# Patient Record
Sex: FEMALE | Race: BLACK OR AFRICAN AMERICAN | Employment: OTHER | URBAN - METROPOLITAN AREA
[De-identification: names, ages, dates, MRNs, and addresses within clinical notes are randomized per-mention and may not be internally consistent; named-entity substitution may affect disease eponyms.]

---

## 2017-01-28 RX ORDER — DEXLANSOPRAZOLE 60 MG/1
CAPSULE, DELAYED RELEASE ORAL
Qty: 30 CAPSULE | Refills: 0 | Status: SHIPPED | OUTPATIENT
Start: 2017-01-28 | End: 2017-02-26 | Stop reason: SDUPTHER

## 2017-01-28 RX ORDER — BUPROPION HYDROCHLORIDE 300 MG/1
TABLET ORAL
Qty: 30 TABLET | Refills: 3 | Status: SHIPPED | OUTPATIENT
Start: 2017-01-28 | End: 2017-04-05

## 2017-03-02 RX ORDER — DEXLANSOPRAZOLE 60 MG/1
CAPSULE, DELAYED RELEASE ORAL
Qty: 30 CAPSULE | Refills: 0 | Status: SHIPPED | OUTPATIENT
Start: 2017-03-02 | End: 2017-04-05 | Stop reason: SDUPTHER

## 2017-03-21 ENCOUNTER — TELEPHONE (OUTPATIENT)
Dept: INTERNAL MEDICINE CLINIC | Age: 62
End: 2017-03-21

## 2017-03-21 DIAGNOSIS — Z12.31 VISIT FOR SCREENING MAMMOGRAM: ICD-10-CM

## 2017-03-21 DIAGNOSIS — Z12.11 SCREENING FOR COLORECTAL CANCER: Primary | ICD-10-CM

## 2017-03-21 DIAGNOSIS — Z12.12 SCREENING FOR COLORECTAL CANCER: Primary | ICD-10-CM

## 2017-03-28 RX ORDER — DEXLANSOPRAZOLE 60 MG/1
CAPSULE, DELAYED RELEASE ORAL
Qty: 30 CAPSULE | Refills: 0 | OUTPATIENT
Start: 2017-03-28

## 2017-04-05 ENCOUNTER — OFFICE VISIT (OUTPATIENT)
Dept: INTERNAL MEDICINE CLINIC | Age: 62
End: 2017-04-05
Payer: MEDICARE

## 2017-04-05 VITALS
HEIGHT: 63 IN | WEIGHT: 237.8 LBS | RESPIRATION RATE: 19 BRPM | DIASTOLIC BLOOD PRESSURE: 80 MMHG | BODY MASS INDEX: 42.13 KG/M2 | HEART RATE: 94 BPM | SYSTOLIC BLOOD PRESSURE: 138 MMHG | OXYGEN SATURATION: 97 %

## 2017-04-05 DIAGNOSIS — D50.0 IRON DEFICIENCY ANEMIA DUE TO CHRONIC BLOOD LOSS: ICD-10-CM

## 2017-04-05 DIAGNOSIS — E66.01 MORBID OBESITY WITH BMI OF 40.0-44.9, ADULT (HCC): ICD-10-CM

## 2017-04-05 DIAGNOSIS — M05.711 RHEUMATOID ARTHRITIS INVOLVING RIGHT SHOULDER WITH POSITIVE RHEUMATOID FACTOR (HCC): ICD-10-CM

## 2017-04-05 DIAGNOSIS — M81.0 OSTEOPOROSIS: ICD-10-CM

## 2017-04-05 DIAGNOSIS — K21.9 GASTROESOPHAGEAL REFLUX DISEASE WITHOUT ESOPHAGITIS: ICD-10-CM

## 2017-04-05 DIAGNOSIS — I10 ESSENTIAL HYPERTENSION: ICD-10-CM

## 2017-04-05 DIAGNOSIS — J44.9 CHRONIC OBSTRUCTIVE PULMONARY DISEASE, UNSPECIFIED COPD TYPE (HCC): Primary | ICD-10-CM

## 2017-04-05 PROCEDURE — 3014F SCREEN MAMMO DOC REV: CPT | Performed by: FAMILY MEDICINE

## 2017-04-05 PROCEDURE — G8926 SPIRO NO PERF OR DOC: HCPCS | Performed by: FAMILY MEDICINE

## 2017-04-05 PROCEDURE — 99214 OFFICE O/P EST MOD 30 MIN: CPT | Performed by: FAMILY MEDICINE

## 2017-04-05 PROCEDURE — 4005F PHARM THX FOR OP RXD: CPT | Performed by: FAMILY MEDICINE

## 2017-04-05 PROCEDURE — 3017F COLORECTAL CA SCREEN DOC REV: CPT | Performed by: FAMILY MEDICINE

## 2017-04-05 PROCEDURE — 3023F SPIROM DOC REV: CPT | Performed by: FAMILY MEDICINE

## 2017-04-05 PROCEDURE — G8417 CALC BMI ABV UP PARAM F/U: HCPCS | Performed by: FAMILY MEDICINE

## 2017-04-05 PROCEDURE — G8427 DOCREV CUR MEDS BY ELIG CLIN: HCPCS | Performed by: FAMILY MEDICINE

## 2017-04-05 PROCEDURE — 1036F TOBACCO NON-USER: CPT | Performed by: FAMILY MEDICINE

## 2017-04-05 RX ORDER — DEXLANSOPRAZOLE 60 MG/1
60 CAPSULE, DELAYED RELEASE ORAL DAILY
Qty: 30 CAPSULE | Refills: 3 | Status: SHIPPED | OUTPATIENT
Start: 2017-04-05 | End: 2017-07-26 | Stop reason: SDUPTHER

## 2017-04-05 ASSESSMENT — PATIENT HEALTH QUESTIONNAIRE - PHQ9
2. FEELING DOWN, DEPRESSED OR HOPELESS: 0
1. LITTLE INTEREST OR PLEASURE IN DOING THINGS: 0
SUM OF ALL RESPONSES TO PHQ9 QUESTIONS 1 & 2: 0
SUM OF ALL RESPONSES TO PHQ QUESTIONS 1-9: 0

## 2017-04-06 ASSESSMENT — ENCOUNTER SYMPTOMS
ALLERGIC/IMMUNOLOGIC NEGATIVE: 1
GASTROINTESTINAL NEGATIVE: 1
COUGH: 1
EYES NEGATIVE: 1

## 2017-04-06 ASSESSMENT — COPD QUESTIONNAIRES: COPD: 1

## 2017-04-25 ENCOUNTER — HOSPITAL ENCOUNTER (EMERGENCY)
Age: 62
Discharge: HOME | End: 2017-04-25
Attending: EMERGENCY MEDICINE
Payer: OTHER MISCELLANEOUS

## 2017-04-25 ENCOUNTER — APPOINTMENT (OUTPATIENT)
Dept: GENERAL RADIOLOGY | Age: 62
End: 2017-04-25
Payer: OTHER MISCELLANEOUS

## 2017-04-25 VITALS
HEART RATE: 88 BPM | TEMPERATURE: 99.2 F | OXYGEN SATURATION: 98 % | HEIGHT: 63 IN | RESPIRATION RATE: 16 BRPM | WEIGHT: 237.3 LBS | SYSTOLIC BLOOD PRESSURE: 150 MMHG | DIASTOLIC BLOOD PRESSURE: 75 MMHG | BODY MASS INDEX: 42.05 KG/M2

## 2017-04-25 DIAGNOSIS — S80.01XA CONTUSION OF RIGHT KNEE, INITIAL ENCOUNTER: Primary | ICD-10-CM
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
## 2017-04-25 DIAGNOSIS — S39.012A LUMBAR STRAIN, INITIAL ENCOUNTER: ICD-10-CM

## 2017-04-25 PROCEDURE — 99283 EMERGENCY DEPT VISIT LOW MDM: CPT

## 2017-04-25 PROCEDURE — 72100 X-RAY EXAM L-S SPINE 2/3 VWS: CPT

## 2017-04-25 PROCEDURE — 73562 X-RAY EXAM OF KNEE 3: CPT

## 2017-04-25 PROCEDURE — 6360000002 HC RX W HCPCS: Performed by: NURSE PRACTITIONER

## 2017-04-25 PROCEDURE — 96372 THER/PROPH/DIAG INJ SC/IM: CPT

## 2017-04-25 RX ORDER — ORPHENADRINE CITRATE 30 MG/ML
60 INJECTION INTRAMUSCULAR; INTRAVENOUS ONCE
Status: COMPLETED | OUTPATIENT
Start: 2017-04-25 | End: 2017-04-25

## 2017-04-25 RX ORDER — TIZANIDINE 4 MG/1
4 TABLET ORAL 3 TIMES DAILY PRN
Qty: 15 TABLET | Refills: 0 | Status: SHIPPED | OUTPATIENT
Start: 2017-04-25 | End: 2017-04-25

## 2017-04-25 RX ORDER — TIZANIDINE 2 MG/1
4 TABLET ORAL 3 TIMES DAILY PRN
Qty: 15 TABLET | Refills: 0 | Status: SHIPPED | OUTPATIENT
Start: 2017-04-25 | End: 2017-04-30

## 2017-04-25 RX ADMIN — ORPHENADRINE CITRATE 60 MG: 30 INJECTION INTRAMUSCULAR; INTRAVENOUS at 16:54

## 2017-04-25 ASSESSMENT — PAIN SCALES - GENERAL: PAINLEVEL_OUTOF10: 8

## 2017-04-25 ASSESSMENT — ENCOUNTER SYMPTOMS
SHORTNESS OF BREATH: 0
ABDOMINAL DISTENTION: 0
BACK PAIN: 1

## 2017-04-25 ASSESSMENT — PAIN SCALES - WONG BAKER: WONGBAKER_NUMERICALRESPONSE: 8

## 2017-04-25 ASSESSMENT — PAIN DESCRIPTION - LOCATION: LOCATION: BACK;LEG

## 2017-04-25 ASSESSMENT — PAIN DESCRIPTION - ORIENTATION: ORIENTATION: RIGHT

## 2017-04-27 ENCOUNTER — TELEPHONE (OUTPATIENT)
Dept: INTERNAL MEDICINE CLINIC | Age: 62
End: 2017-04-27

## 2017-04-27 RX ORDER — MONTELUKAST SODIUM 10 MG/1
TABLET ORAL
Qty: 30 TABLET | Refills: 5 | Status: SHIPPED | OUTPATIENT
Start: 2017-04-27 | End: 2017-10-24 | Stop reason: SDUPTHER

## 2017-05-02 ENCOUNTER — TELEPHONE (OUTPATIENT)
Dept: INTERNAL MEDICINE CLINIC | Age: 62
End: 2017-05-02

## 2017-05-02 ENCOUNTER — HOSPITAL ENCOUNTER (OUTPATIENT)
Age: 62
Setting detail: SPECIMEN
Discharge: HOME | End: 2017-05-02
Payer: MEDICARE

## 2017-05-02 ENCOUNTER — OFFICE VISIT (OUTPATIENT)
Dept: INTERNAL MEDICINE CLINIC | Age: 62
End: 2017-05-02
Payer: MEDICARE

## 2017-05-02 VITALS
HEIGHT: 63 IN | RESPIRATION RATE: 16 BRPM | WEIGHT: 239 LBS | BODY MASS INDEX: 42.35 KG/M2 | TEMPERATURE: 98.2 F | SYSTOLIC BLOOD PRESSURE: 136 MMHG | DIASTOLIC BLOOD PRESSURE: 82 MMHG | OXYGEN SATURATION: 93 % | HEART RATE: 68 BPM

## 2017-05-02 DIAGNOSIS — S80.01XA CONTUSION OF RIGHT KNEE, INITIAL ENCOUNTER: ICD-10-CM

## 2017-05-02 DIAGNOSIS — S39.012D LUMBAR STRAIN, SUBSEQUENT ENCOUNTER: Primary | ICD-10-CM

## 2017-05-02 DIAGNOSIS — Z13.9 SCREENING: Primary | ICD-10-CM

## 2017-05-02 DIAGNOSIS — Z13.9 SCREENING: ICD-10-CM

## 2017-05-02 DIAGNOSIS — L98.499: ICD-10-CM

## 2017-05-02 LAB
HEPATITIS C ANTIBODY: NONREACTIVE
HIV AG/AB: NONREACTIVE

## 2017-05-02 PROCEDURE — 99213 OFFICE O/P EST LOW 20 MIN: CPT | Performed by: FAMILY MEDICINE

## 2017-05-02 RX ORDER — BUPROPION HYDROCHLORIDE 300 MG/1
300 TABLET ORAL
Refills: 0 | COMMUNITY
Start: 2017-04-27 | End: 2017-09-06

## 2017-05-02 ASSESSMENT — ENCOUNTER SYMPTOMS
STRIDOR: 0
EYE DISCHARGE: 0
EYE PAIN: 0
ABDOMINAL DISTENTION: 0
COUGH: 0
ABDOMINAL PAIN: 0
CHEST TIGHTNESS: 0
COLOR CHANGE: 0
SORE THROAT: 0
TROUBLE SWALLOWING: 0
WHEEZING: 0
EYE REDNESS: 0
CONSTIPATION: 0
ANAL BLEEDING: 0
FACIAL SWELLING: 0
BACK PAIN: 1
SHORTNESS OF BREATH: 0

## 2017-05-30 DIAGNOSIS — Z12.31 ENCOUNTER FOR SCREENING MAMMOGRAM FOR BREAST CANCER: Primary | ICD-10-CM

## 2017-06-20 ENCOUNTER — HOSPITAL ENCOUNTER (OUTPATIENT)
Dept: MAMMOGRAPHY | Age: 62
Discharge: HOME | End: 2017-06-20
Payer: MEDICARE

## 2017-06-20 DIAGNOSIS — Z12.31 ENCOUNTER FOR SCREENING MAMMOGRAM FOR BREAST CANCER: ICD-10-CM

## 2017-06-20 DIAGNOSIS — Z13.9 SCREENING: ICD-10-CM

## 2017-06-20 PROCEDURE — G0202 SCR MAMMO BI INCL CAD: HCPCS

## 2017-06-20 PROCEDURE — 77063 BREAST TOMOSYNTHESIS BI: CPT

## 2017-07-26 RX ORDER — DEXLANSOPRAZOLE 60 MG/1
CAPSULE, DELAYED RELEASE ORAL
Qty: 30 CAPSULE | Refills: 3 | Status: SHIPPED | OUTPATIENT
Start: 2017-07-26 | End: 2017-12-13 | Stop reason: SDUPTHER

## 2017-09-06 ENCOUNTER — OFFICE VISIT (OUTPATIENT)
Dept: INTERNAL MEDICINE CLINIC | Age: 62
End: 2017-09-06
Payer: MEDICARE

## 2017-09-06 VITALS
BODY MASS INDEX: 41.99 KG/M2 | DIASTOLIC BLOOD PRESSURE: 72 MMHG | OXYGEN SATURATION: 97 % | HEIGHT: 63 IN | SYSTOLIC BLOOD PRESSURE: 140 MMHG | WEIGHT: 237 LBS | HEART RATE: 87 BPM | RESPIRATION RATE: 16 BRPM

## 2017-09-06 DIAGNOSIS — M81.8 OTHER OSTEOPOROSIS: ICD-10-CM

## 2017-09-06 DIAGNOSIS — M06.9 RHEUMATOID ARTHRITIS INVOLVING MULTIPLE SITES, UNSPECIFIED RHEUMATOID FACTOR PRESENCE: ICD-10-CM

## 2017-09-06 DIAGNOSIS — K21.9 GASTROESOPHAGEAL REFLUX DISEASE WITHOUT ESOPHAGITIS: ICD-10-CM

## 2017-09-06 DIAGNOSIS — R19.7 DIARRHEA, UNSPECIFIED TYPE: ICD-10-CM

## 2017-09-06 DIAGNOSIS — I10 ESSENTIAL HYPERTENSION: ICD-10-CM

## 2017-09-06 DIAGNOSIS — Z99.89 OSA ON CPAP: ICD-10-CM

## 2017-09-06 DIAGNOSIS — G47.33 OSA ON CPAP: ICD-10-CM

## 2017-09-06 DIAGNOSIS — J42 CHRONIC BRONCHITIS, UNSPECIFIED CHRONIC BRONCHITIS TYPE (HCC): Primary | ICD-10-CM

## 2017-09-06 DIAGNOSIS — M15.9 GENERALIZED OA: ICD-10-CM

## 2017-09-06 PROCEDURE — G8417 CALC BMI ABV UP PARAM F/U: HCPCS | Performed by: FAMILY MEDICINE

## 2017-09-06 PROCEDURE — 3023F SPIROM DOC REV: CPT | Performed by: FAMILY MEDICINE

## 2017-09-06 PROCEDURE — 4005F PHARM THX FOR OP RXD: CPT | Performed by: FAMILY MEDICINE

## 2017-09-06 PROCEDURE — 3014F SCREEN MAMMO DOC REV: CPT | Performed by: FAMILY MEDICINE

## 2017-09-06 PROCEDURE — G8926 SPIRO NO PERF OR DOC: HCPCS | Performed by: FAMILY MEDICINE

## 2017-09-06 PROCEDURE — 99214 OFFICE O/P EST MOD 30 MIN: CPT | Performed by: FAMILY MEDICINE

## 2017-09-06 PROCEDURE — 1036F TOBACCO NON-USER: CPT | Performed by: FAMILY MEDICINE

## 2017-09-06 PROCEDURE — G8427 DOCREV CUR MEDS BY ELIG CLIN: HCPCS | Performed by: FAMILY MEDICINE

## 2017-09-06 PROCEDURE — 3017F COLORECTAL CA SCREEN DOC REV: CPT | Performed by: FAMILY MEDICINE

## 2017-09-06 ASSESSMENT — ENCOUNTER SYMPTOMS
GASTROINTESTINAL NEGATIVE: 1
COUGH: 1
ALLERGIC/IMMUNOLOGIC NEGATIVE: 1
EYES NEGATIVE: 1

## 2017-09-06 ASSESSMENT — COPD QUESTIONNAIRES: COPD: 1

## 2017-09-07 ENCOUNTER — HOSPITAL ENCOUNTER (OUTPATIENT)
Age: 62
Setting detail: SPECIMEN
Discharge: HOME | End: 2017-09-07
Payer: MEDICARE

## 2017-09-07 DIAGNOSIS — R19.7 DIARRHEA, UNSPECIFIED TYPE: ICD-10-CM

## 2017-09-07 DIAGNOSIS — I10 ESSENTIAL HYPERTENSION: ICD-10-CM

## 2017-09-07 LAB
ALBUMIN SERPL-MCNC: 4 G/DL (ref 3.5–5.2)
ALBUMIN/GLOBULIN RATIO: 1.3 (ref 1–2.5)
ALP BLD-CCNC: 66 U/L (ref 35–104)
ALT SERPL-CCNC: 21 U/L (ref 5–33)
ANION GAP SERPL CALCULATED.3IONS-SCNC: 13 MMOL/L (ref 9–17)
AST SERPL-CCNC: 25 U/L
BILIRUB SERPL-MCNC: 0.34 MG/DL (ref 0.3–1.2)
BUN BLDV-MCNC: 9 MG/DL (ref 8–23)
BUN/CREAT BLD: NORMAL (ref 9–20)
CALCIUM SERPL-MCNC: 9.2 MG/DL (ref 8.6–10.4)
CHLORIDE BLD-SCNC: 106 MMOL/L (ref 98–107)
CHOLESTEROL/HDL RATIO: 1.9
CHOLESTEROL: 195 MG/DL
CO2: 23 MMOL/L (ref 20–31)
CREAT SERPL-MCNC: 0.65 MG/DL (ref 0.5–0.9)
ESTIMATED AVERAGE GLUCOSE: 114 MG/DL
GFR AFRICAN AMERICAN: >60 ML/MIN
GFR NON-AFRICAN AMERICAN: >60 ML/MIN
GFR SERPL CREATININE-BSD FRML MDRD: NORMAL ML/MIN/{1.73_M2}
GFR SERPL CREATININE-BSD FRML MDRD: NORMAL ML/MIN/{1.73_M2}
GLUCOSE BLD-MCNC: 89 MG/DL (ref 70–99)
HAV IGM SER IA-ACNC: NONREACTIVE
HBA1C MFR BLD: 5.6 % (ref 4–6)
HCT VFR BLD CALC: 39.5 % (ref 36–46)
HDLC SERPL-MCNC: 102 MG/DL
HEMOGLOBIN: 12.9 G/DL (ref 12–16)
HEPATITIS B CORE IGM ANTIBODY: NONREACTIVE
HEPATITIS B SURFACE ANTIGEN: NONREACTIVE
HEPATITIS C ANTIBODY: NONREACTIVE
HIV AG/AB: NONREACTIVE
LDL CHOLESTEROL: 78 MG/DL (ref 0–130)
MCH RBC QN AUTO: 28.5 PG (ref 26–34)
MCHC RBC AUTO-ENTMCNC: 32.5 G/DL (ref 31–37)
MCV RBC AUTO: 87.6 FL (ref 80–100)
PDW BLD-RTO: 17 % (ref 12.5–15.4)
PLATELET # BLD: 350 K/UL (ref 140–450)
PMV BLD AUTO: 9.3 FL (ref 6–12)
POTASSIUM SERPL-SCNC: 3.7 MMOL/L (ref 3.7–5.3)
RBC # BLD: 4.51 M/UL (ref 4–5.2)
SODIUM BLD-SCNC: 142 MMOL/L (ref 135–144)
T. PALLIDUM, IGG: NONREACTIVE
TOTAL PROTEIN: 7.1 G/DL (ref 6.4–8.3)
TRIGL SERPL-MCNC: 74 MG/DL
TSH SERPL DL<=0.05 MIU/L-ACNC: 1.55 MIU/L (ref 0.3–5)
VLDLC SERPL CALC-MCNC: NORMAL MG/DL (ref 1–30)
WBC # BLD: 12 K/UL (ref 3.5–11)

## 2017-09-28 ENCOUNTER — OFFICE VISIT (OUTPATIENT)
Dept: GASTROENTEROLOGY | Age: 62
End: 2017-09-28
Payer: MEDICARE

## 2017-09-28 VITALS
HEART RATE: 82 BPM | SYSTOLIC BLOOD PRESSURE: 128 MMHG | HEIGHT: 63 IN | WEIGHT: 238 LBS | DIASTOLIC BLOOD PRESSURE: 78 MMHG | BODY MASS INDEX: 42.17 KG/M2 | OXYGEN SATURATION: 96 %

## 2017-09-28 DIAGNOSIS — R19.7 DIARRHEA, UNSPECIFIED TYPE: ICD-10-CM

## 2017-09-28 DIAGNOSIS — K21.9 GASTROESOPHAGEAL REFLUX DISEASE WITHOUT ESOPHAGITIS: Primary | ICD-10-CM

## 2017-09-28 PROCEDURE — 3017F COLORECTAL CA SCREEN DOC REV: CPT | Performed by: INTERNAL MEDICINE

## 2017-09-28 PROCEDURE — G8417 CALC BMI ABV UP PARAM F/U: HCPCS | Performed by: INTERNAL MEDICINE

## 2017-09-28 PROCEDURE — G8427 DOCREV CUR MEDS BY ELIG CLIN: HCPCS | Performed by: INTERNAL MEDICINE

## 2017-09-28 PROCEDURE — 99204 OFFICE O/P NEW MOD 45 MIN: CPT | Performed by: INTERNAL MEDICINE

## 2017-09-28 PROCEDURE — 3014F SCREEN MAMMO DOC REV: CPT | Performed by: INTERNAL MEDICINE

## 2017-09-28 PROCEDURE — 1036F TOBACCO NON-USER: CPT | Performed by: INTERNAL MEDICINE

## 2017-09-28 RX ORDER — ALUMINUM ZIRCONIUM OCTACHLOROHYDREX GLY 16 G/100G
1 GEL TOPICAL DAILY
Qty: 30 CAPSULE | Refills: 2 | Status: SHIPPED | OUTPATIENT
Start: 2017-09-28 | End: 2017-10-28

## 2017-09-28 ASSESSMENT — ENCOUNTER SYMPTOMS
BACK PAIN: 1
BLOOD IN STOOL: 0
ABDOMINAL PAIN: 0
DIARRHEA: 1
NAUSEA: 0
TROUBLE SWALLOWING: 0
ANAL BLEEDING: 0
VOMITING: 0
CHOKING: 0
CONSTIPATION: 0
SORE THROAT: 0
RECTAL PAIN: 0
COUGH: 0
ABDOMINAL DISTENTION: 0
SHORTNESS OF BREATH: 0

## 2017-10-09 ENCOUNTER — OFFICE VISIT (OUTPATIENT)
Dept: INTERNAL MEDICINE CLINIC | Age: 62
End: 2017-10-09
Payer: MEDICARE

## 2017-10-09 VITALS
HEIGHT: 63 IN | HEART RATE: 90 BPM | RESPIRATION RATE: 19 BRPM | BODY MASS INDEX: 42.84 KG/M2 | OXYGEN SATURATION: 98 % | WEIGHT: 241.8 LBS | DIASTOLIC BLOOD PRESSURE: 64 MMHG | SYSTOLIC BLOOD PRESSURE: 120 MMHG

## 2017-10-09 DIAGNOSIS — J44.9 CHRONIC OBSTRUCTIVE PULMONARY DISEASE, UNSPECIFIED COPD TYPE (HCC): Primary | ICD-10-CM

## 2017-10-09 DIAGNOSIS — J30.1 NON-SEASONAL ALLERGIC RHINITIS DUE TO POLLEN: ICD-10-CM

## 2017-10-09 DIAGNOSIS — I10 ESSENTIAL HYPERTENSION: ICD-10-CM

## 2017-10-09 DIAGNOSIS — G47.33 OSA ON CPAP: ICD-10-CM

## 2017-10-09 DIAGNOSIS — Z99.89 OSA ON CPAP: ICD-10-CM

## 2017-10-09 DIAGNOSIS — K21.9 GASTROESOPHAGEAL REFLUX DISEASE WITHOUT ESOPHAGITIS: ICD-10-CM

## 2017-10-09 DIAGNOSIS — M06.9 RHEUMATOID ARTHRITIS INVOLVING MULTIPLE SITES, UNSPECIFIED RHEUMATOID FACTOR PRESENCE: ICD-10-CM

## 2017-10-09 PROCEDURE — 90688 IIV4 VACCINE SPLT 0.5 ML IM: CPT | Performed by: FAMILY MEDICINE

## 2017-10-09 PROCEDURE — 99214 OFFICE O/P EST MOD 30 MIN: CPT | Performed by: FAMILY MEDICINE

## 2017-10-09 PROCEDURE — 90732 PPSV23 VACC 2 YRS+ SUBQ/IM: CPT | Performed by: FAMILY MEDICINE

## 2017-10-09 PROCEDURE — G0009 ADMIN PNEUMOCOCCAL VACCINE: HCPCS | Performed by: FAMILY MEDICINE

## 2017-10-09 PROCEDURE — G0008 ADMIN INFLUENZA VIRUS VAC: HCPCS | Performed by: FAMILY MEDICINE

## 2017-10-09 ASSESSMENT — ENCOUNTER SYMPTOMS
EYES NEGATIVE: 1
GASTROINTESTINAL NEGATIVE: 1
SHORTNESS OF BREATH: 1
ALLERGIC/IMMUNOLOGIC NEGATIVE: 1

## 2017-10-09 ASSESSMENT — COPD QUESTIONNAIRES: COPD: 1

## 2017-10-09 NOTE — PROGRESS NOTES
Chronic Disease Visit Information    BP Readings from Last 3 Encounters:   09/28/17 128/78   09/06/17 (!) 140/72   05/02/17 136/82          Hemoglobin A1C (%)   Date Value   09/07/2017 5.6   01/19/2016 5.4     LDL Cholesterol (mg/dL)   Date Value   09/07/2017 78     HDL (mg/dL)   Date Value   09/07/2017 102     BUN (mg/dL)   Date Value   09/07/2017 9     CREATININE (mg/dL)   Date Value   09/07/2017 0.65     Glucose (mg/dL)   Date Value   09/07/2017 89            Have you changed or started any medications since your last visit including any over-the-counter medicines, vitamins, or herbal medicines? no   Are you having any side effects from any of your medications? -  no  Have you stopped taking any of your medications? Is so, why? -  no    Have you seen any other physician or provider since your last visit? No  Have you had any other diagnostic tests since your last visit? No  Have you been seen in the emergency room and/or had an admission to a hospital since we last saw you? No  Have you had your annual diabetic retinal (eye) exam? No  Have you had your routine dental cleaning in the past 6 months? no    Have you activated your [employer] account? If not, what are your barriers?  Yes     Patient Care Team:  Otilia Love MD as PCP - General (Family Medicine)  Otilia Love MD as PCP - S Attributed Provider  Shlomo Murillo MD as Consulting Physician (Rheumatology)  Klarissa Guthrie MD as Surgeon (Orthopedic Surgery)  Lucius Villareal MD as Consulting Physician (Gastroenterology)  Gómez Bustillos MD as Consulting Physician (Cardiology)  Eunice Thomas MD as Consulting Physician (Pulmonary Disease)  Nae Solorzano MD as Consulting Physician (Gastroenterology)         Medical History Review  Past Medical, Family, and Social History reviewed and does contribute to the patient presenting condition    Health Maintenance   Topic Date Due    Flu vaccine (1) 09/01/2017    Zostavax vaccine  02/23/2018 (Originally 6/12/2014)  Cervical cancer screen  05/22/2018 (Originally 6/12/1975)    Pneumococcal med risk (1 of 1 - PPSV23) 05/22/2018 (Originally 6/12/1973)    DTaP/Tdap/Td vaccine (1 - Tdap) 09/06/2018 (Originally 6/12/1973)    Breast cancer screen  06/20/2019    Colon cancer screen colonoscopy  03/30/2021    Lipid screen  09/07/2022    Hepatitis C screen  Completed    HIV screen  Completed

## 2017-10-24 RX ORDER — MONTELUKAST SODIUM 10 MG/1
TABLET ORAL
Qty: 30 TABLET | Refills: 5 | Status: SHIPPED | OUTPATIENT
Start: 2017-10-24 | End: 2017-12-13 | Stop reason: SDUPTHER

## 2017-11-14 ENCOUNTER — ANESTHESIA EVENT (OUTPATIENT)
Dept: OPERATING ROOM | Age: 62
End: 2017-11-14
Payer: MEDICARE

## 2017-11-15 ENCOUNTER — ANESTHESIA (OUTPATIENT)
Dept: OPERATING ROOM | Age: 62
End: 2017-11-15
Payer: MEDICARE

## 2017-11-15 ENCOUNTER — HOSPITAL ENCOUNTER (OUTPATIENT)
Age: 62
Setting detail: OUTPATIENT SURGERY
Discharge: HOME | End: 2017-11-15
Attending: INTERNAL MEDICINE | Admitting: INTERNAL MEDICINE
Payer: MEDICARE

## 2017-11-15 VITALS
DIASTOLIC BLOOD PRESSURE: 77 MMHG | WEIGHT: 245 LBS | BODY MASS INDEX: 45.08 KG/M2 | TEMPERATURE: 97.2 F | HEART RATE: 81 BPM | OXYGEN SATURATION: 95 % | SYSTOLIC BLOOD PRESSURE: 156 MMHG | RESPIRATION RATE: 14 BRPM | HEIGHT: 62 IN

## 2017-11-15 VITALS
DIASTOLIC BLOOD PRESSURE: 57 MMHG | RESPIRATION RATE: 19 BRPM | SYSTOLIC BLOOD PRESSURE: 114 MMHG | OXYGEN SATURATION: 97 %

## 2017-11-15 PROCEDURE — 3700000001 HC ADD 15 MINUTES (ANESTHESIA): Performed by: INTERNAL MEDICINE

## 2017-11-15 PROCEDURE — 6360000002 HC RX W HCPCS: Performed by: NURSE ANESTHETIST, CERTIFIED REGISTERED

## 2017-11-15 PROCEDURE — 2580000003 HC RX 258: Performed by: ANESTHESIOLOGY

## 2017-11-15 PROCEDURE — 3609017100 HC EGD: Performed by: INTERNAL MEDICINE

## 2017-11-15 PROCEDURE — 3700000000 HC ANESTHESIA ATTENDED CARE: Performed by: INTERNAL MEDICINE

## 2017-11-15 PROCEDURE — 43235 EGD DIAGNOSTIC BRUSH WASH: CPT | Performed by: INTERNAL MEDICINE

## 2017-11-15 PROCEDURE — 7100000011 HC PHASE II RECOVERY - ADDTL 15 MIN: Performed by: INTERNAL MEDICINE

## 2017-11-15 PROCEDURE — 7100000010 HC PHASE II RECOVERY - FIRST 15 MIN: Performed by: INTERNAL MEDICINE

## 2017-11-15 PROCEDURE — 2500000003 HC RX 250 WO HCPCS: Performed by: NURSE ANESTHETIST, CERTIFIED REGISTERED

## 2017-11-15 RX ORDER — SODIUM CHLORIDE 0.9 % (FLUSH) 0.9 %
10 SYRINGE (ML) INJECTION PRN
Status: DISCONTINUED | OUTPATIENT
Start: 2017-11-15 | End: 2017-11-15 | Stop reason: HOSPADM

## 2017-11-15 RX ORDER — SODIUM CHLORIDE, SODIUM LACTATE, POTASSIUM CHLORIDE, CALCIUM CHLORIDE 600; 310; 30; 20 MG/100ML; MG/100ML; MG/100ML; MG/100ML
INJECTION, SOLUTION INTRAVENOUS CONTINUOUS
Status: DISCONTINUED | OUTPATIENT
Start: 2017-11-15 | End: 2017-11-15 | Stop reason: HOSPADM

## 2017-11-15 RX ORDER — SODIUM CHLORIDE 9 MG/ML
INJECTION, SOLUTION INTRAVENOUS CONTINUOUS
Status: DISCONTINUED | OUTPATIENT
Start: 2017-11-15 | End: 2017-11-15

## 2017-11-15 RX ORDER — FENTANYL CITRATE 50 UG/ML
INJECTION, SOLUTION INTRAMUSCULAR; INTRAVENOUS PRN
Status: DISCONTINUED | OUTPATIENT
Start: 2017-11-15 | End: 2017-11-15 | Stop reason: SDUPTHER

## 2017-11-15 RX ORDER — LIDOCAINE HYDROCHLORIDE 20 MG/ML
INJECTION, SOLUTION EPIDURAL; INFILTRATION; INTRACAUDAL; PERINEURAL PRN
Status: DISCONTINUED | OUTPATIENT
Start: 2017-11-15 | End: 2017-11-15 | Stop reason: SDUPTHER

## 2017-11-15 RX ORDER — FENTANYL CITRATE 50 UG/ML
25 INJECTION, SOLUTION INTRAMUSCULAR; INTRAVENOUS EVERY 5 MIN PRN
Status: DISCONTINUED | OUTPATIENT
Start: 2017-11-15 | End: 2017-11-15 | Stop reason: HOSPADM

## 2017-11-15 RX ORDER — MIDAZOLAM HYDROCHLORIDE 1 MG/ML
INJECTION INTRAMUSCULAR; INTRAVENOUS PRN
Status: DISCONTINUED | OUTPATIENT
Start: 2017-11-15 | End: 2017-11-15 | Stop reason: SDUPTHER

## 2017-11-15 RX ORDER — LIDOCAINE HYDROCHLORIDE 10 MG/ML
1 INJECTION, SOLUTION EPIDURAL; INFILTRATION; INTRACAUDAL; PERINEURAL
Status: DISCONTINUED | OUTPATIENT
Start: 2017-11-15 | End: 2017-11-15 | Stop reason: HOSPADM

## 2017-11-15 RX ORDER — SODIUM CHLORIDE 0.9 % (FLUSH) 0.9 %
10 SYRINGE (ML) INJECTION EVERY 12 HOURS SCHEDULED
Status: DISCONTINUED | OUTPATIENT
Start: 2017-11-15 | End: 2017-11-15 | Stop reason: HOSPADM

## 2017-11-15 RX ORDER — PROPOFOL 10 MG/ML
INJECTION, EMULSION INTRAVENOUS PRN
Status: DISCONTINUED | OUTPATIENT
Start: 2017-11-15 | End: 2017-11-15 | Stop reason: SDUPTHER

## 2017-11-15 RX ORDER — RANITIDINE 300 MG/1
300 TABLET ORAL NIGHTLY
Qty: 30 TABLET | Refills: 3 | Status: SHIPPED | OUTPATIENT
Start: 2017-11-15 | End: 2019-05-31 | Stop reason: ALTCHOICE

## 2017-11-15 RX ORDER — FENTANYL CITRATE 50 UG/ML
50 INJECTION, SOLUTION INTRAMUSCULAR; INTRAVENOUS EVERY 5 MIN PRN
Status: DISCONTINUED | OUTPATIENT
Start: 2017-11-15 | End: 2017-11-15 | Stop reason: HOSPADM

## 2017-11-15 RX ORDER — ONDANSETRON 2 MG/ML
4 INJECTION INTRAMUSCULAR; INTRAVENOUS
Status: DISCONTINUED | OUTPATIENT
Start: 2017-11-15 | End: 2017-11-15 | Stop reason: HOSPADM

## 2017-11-15 RX ADMIN — PROPOFOL 10 MG: 10 INJECTION, EMULSION INTRAVENOUS at 08:38

## 2017-11-15 RX ADMIN — SODIUM CHLORIDE, POTASSIUM CHLORIDE, SODIUM LACTATE AND CALCIUM CHLORIDE: 600; 310; 30; 20 INJECTION, SOLUTION INTRAVENOUS at 08:06

## 2017-11-15 RX ADMIN — SODIUM CHLORIDE, POTASSIUM CHLORIDE, SODIUM LACTATE AND CALCIUM CHLORIDE: 600; 310; 30; 20 INJECTION, SOLUTION INTRAVENOUS at 08:27

## 2017-11-15 RX ADMIN — PROPOFOL 30 MG: 10 INJECTION, EMULSION INTRAVENOUS at 08:36

## 2017-11-15 RX ADMIN — MIDAZOLAM HYDROCHLORIDE 2 MG: 1 INJECTION, SOLUTION INTRAMUSCULAR; INTRAVENOUS at 08:27

## 2017-11-15 RX ADMIN — LIDOCAINE HYDROCHLORIDE 100 MG: 20 INJECTION, SOLUTION EPIDURAL; INFILTRATION; INTRACAUDAL; PERINEURAL at 08:35

## 2017-11-15 RX ADMIN — FENTANYL CITRATE 25 MCG: 50 INJECTION, SOLUTION INTRAMUSCULAR; INTRAVENOUS at 08:35

## 2017-11-15 RX ADMIN — PROPOFOL 30 MG: 10 INJECTION, EMULSION INTRAVENOUS at 08:37

## 2017-11-15 RX ADMIN — PROPOFOL 30 MG: 10 INJECTION, EMULSION INTRAVENOUS at 08:35

## 2017-11-15 ASSESSMENT — COPD QUESTIONNAIRES: CAT_SEVERITY: NO INTERVAL CHANGE

## 2017-11-15 ASSESSMENT — PAIN SCALES - GENERAL
PAINLEVEL_OUTOF10: 0

## 2017-11-15 ASSESSMENT — PAIN - FUNCTIONAL ASSESSMENT: PAIN_FUNCTIONAL_ASSESSMENT: 0-10

## 2017-11-15 ASSESSMENT — ENCOUNTER SYMPTOMS: SHORTNESS OF BREATH: 0

## 2017-11-15 ASSESSMENT — PAIN DESCRIPTION - DESCRIPTORS: DESCRIPTORS: ACHING

## 2017-11-15 NOTE — ANESTHESIA POSTPROCEDURE EVALUATION
Department of Anesthesiology  Postprocedure Note    Patient: Anne-Marie Marsh  MRN: [id number]  YOB: 1955  Date of evaluation: 11/15/2017  Time:  11:30 AM     Procedure Summary     Date:  11/15/17 Room / Location:  [place] [address] / [facility]    Anesthesia Start:  0827 Anesthesia Stop:  3228    Procedure:  EGD ESOPHAGOGASTRODUODENOSCOPY (N/A ) Diagnosis:  (GERD)    Surgeon:  Jose Arredondo MD Responsible Provider:  Willem Franklin MD    Anesthesia Type:  MAC ASA Status:  3          Anesthesia Type: MAC    Alessia Phase I: Alessia Score: 10    Alessia Phase II: Alessia Score: 10    Last vitals: Reviewed and per EMR flowsheets.        Anesthesia Post Evaluation    Patient location during evaluation: PACU  Patient participation: complete - patient participated  Level of consciousness: awake and alert  Airway patency: patent  Nausea & Vomiting: no nausea and no vomiting  Complications: no  Cardiovascular status: blood pressure returned to baseline  Respiratory status: acceptable  Hydration status: euvolemic

## 2017-11-15 NOTE — H&P
Pre-procedure H&P     S: presenting for EGD with MAC. GERD    Past Medical History:   Diagnosis Date    Asthma     CHF (congestive heart failure) ([facility] [address] 2006? [facility]    Depression     GERD (gastroesophageal reflux disease)     H/O scarlet fever     Hypertension     Obesity     Osteoarthritis     Rheumatoid arthritis(714.0)     hands, feet, hips, knees    Sleep apnea 2011    Thyroid disease     Ulcer ([facility] [address]           Current Facility-Administered Medications:     lactated ringers infusion, , Intravenous, Continuous, Dea Closs, MD, Last Rate: 50 mL/hr at 11/15/17 0806    sodium chloride flush 0.9 % injection 10 mL, 10 mL, Intravenous, 2 times per day, Dea Closs, MD    sodium chloride flush 0.9 % injection 10 mL, 10 mL, Intravenous, PRN, Dea Closs, MD    lidocaine PF 1 % injection 1 mL, 1 mL, Intradermal, Once PRN, Dea Closs, MD    fentaNYL (SUBLIMAZE) injection 25 mcg, 25 mcg, Intravenous, Q5 Min PRN, Alexander Head MD    fentaNYL (SUBLIMAZE) injection 50 mcg, 50 mcg, Intravenous, Q5 Min PRN, Regina Polo MD    ondansetron (ZOFRAN) injection 4 mg, 4 mg, Intravenous, Once PRN, Alexander Head MD     Social History     Social History    Marital status: Single     Spouse name: N/A    Number of children: 1    Years of education: N/A     Occupational History    Not on file.      Social History Main Topics    Smoking status: Former Smoker    Smokeless tobacco: Never Used      Comment: couple cigs per day    Alcohol use 1.2 oz/week     2 Shots of liquor per week      Comment: weekends    Drug use: No    Sexual activity: Not on file     Other Topics Concern    Not on file     Social History Narrative    No narrative on file        Family History   Problem Relation Age of Onset    Breast Cancer Maternal Grandmother     Diabetes Father     Cancer Mother      bone    Hypertension Brother     Diabetes Brother         A O x 3     Vitals:    11/15/17 0746   BP: (!) 166/83 Pulse: 83   Resp: 18   Temp: 98.6 °F (37 °C)   SpO2: 97%      HEENT: PERRLA CVS S1 S2 RRR   Pulmo Clear BL   Abd Soft NT ND   Ext no edema or rash        A/P: will proceed with planned endoscopy.

## 2017-11-15 NOTE — ANESTHESIA PRE PROCEDURE
Department of Anesthesiology  Preprocedure Note       Name:  Alesha Alva   Age:  60 y.o.  :  1955                                          MRN:  [id number]         Date:  11/15/2017      Surgeon: Dago Cary):  Leonard Monsivais MD    Procedure: Procedure(s):  EGD ESOPHAGOGASTRODUODENOSCOPY    Medications prior to admission:   Prior to Admission medications    Medication Sig Start Date End Date Taking?  Authorizing Provider   montelukast (SINGULAIR) 10 MG tablet take 1 tablet by mouth every evening 10/24/17   Ermalene Bamberger, MD   DEXILANT 60 MG CPDR delayed release capsule take 1 capsule by mouth once daily 17   Hanny Nuñez MD   predniSONE (DELTASONE) 5 MG tablet take 1 tablet by mouth BID 16   Historical Provider, MD   albuterol (PROVENTIL) (2.5 MG/3ML) 0.083% nebulizer solution Take 3 mLs by nebulization every 6 hours as needed for Wheezing 16   Ermalene Bamberger, MD   triamterene-hydrochlorothiazide (DYAZIDE) 37.5-25 MG per capsule Take 1 capsule by mouth every morning 16   Ermalene Bamberger, MD   amLODIPine (NORVASC) 5 MG tablet Take 1 tablet by mouth daily 16   Ermalene Bamberger, MD   folic acid (FOLVITE) 1 MG tablet Take 1 mg by mouth daily  16   Historical Provider, MD   methotrexate (RHEUMATREX) 2.5 MG chemo tablet Take 15 mg by mouth once a week Indications: Takes 6 tabs (=15mg) on    Historical Provider, MD   budesonide-formoterol (SYMBICORT) 160-4.5 MCG/ACT AERO Inhale 2 puffs into the lungs 2 times daily 16   Ermalene Bamberger, MD   azelastine (ASTELIN) 0.1 % nasal spray 2 sprays by Nasal route 2 times daily Use in each nostril as directed 10/14/15   Hanny Nuñez MD   loratadine (CLARITIN) 10 MG tablet Take 1 tablet by mouth daily 10/14/15   Hanny Nuñez MD   traMADol-acetaminophen (ULTRACET) 37.5-325 MG TABS Take 1-2 tablets by mouth 3 times daily as needed for Pain     Historical Provider, MD   leflunomide (ARAVA) 20 MG tablet Take Osteoarthritis     Rheumatoid arthritis(714.0)     hands, feet, hips, knees    Sleep apnea 2011    Thyroid disease     Ulcer [facility])        Past Surgical History:        Procedure Laterality Date    CARPAL TUNNEL RELEASE      left wrist    COLONOSCOPY  03/30/2016    three sessile polyps removed; internal hemorrhoids    EYE SURGERY Right 2010    fluid extraction    FOOT SURGERY  1991    HIP SURGERY  2003    left hip    JOINT REPLACEMENT  2002? right total knee    KNEE ARTHROPLASTY Right 1/5/2015    revision of arthroplasty    KNEE SURGERY  2005    right knee    OTHER SURGICAL HISTORY Right 04/09/2015    right leg quad tendon repair.  TONSILLECTOMY AND ADENOIDECTOMY         Social History:    Social History   Substance Use Topics    Smoking status: Former Smoker    Smokeless tobacco: Never Used      Comment: couple cigs per day    Alcohol use 1.2 oz/week     2 Shots of liquor per week      Comment: weekends                                Counseling given: Not Answered      Vital Signs (Current): There were no vitals filed for this visit.                                            BP Readings from Last 3 Encounters:   10/09/17 120/64   09/28/17 128/78   09/06/17 (!) 140/72       NPO Status:                                                                                 BMI:   Wt Readings from Last 3 Encounters:   10/09/17 241 lb 12.8 oz (109.7 kg)   09/28/17 238 lb (108 kg)   09/06/17 237 lb (107.5 kg)     There is no height or weight on file to calculate BMI.    CBC:   Lab Results   Component Value Date    WBC 12.0 09/07/2017    RBC 4.51 09/07/2017    HGB 12.9 09/07/2017    HCT 39.5 09/07/2017    MCV 87.6 09/07/2017    RDW 17.0 09/07/2017     09/07/2017       CMP:   Lab Results   Component Value Date     09/07/2017    K 3.7 09/07/2017     09/07/2017    CO2 23 09/07/2017    BUN 9 09/07/2017    CREATININE 0.65 09/07/2017    GFRAA >60 09/07/2017    LABGLOM >60 09/07/2017    GLUCOSE 89 09/07/2017    PROT 7.1 09/07/2017    CALCIUM 9.2 09/07/2017    BILITOT 0.34 09/07/2017    ALKPHOS 66 09/07/2017    AST 25 09/07/2017    ALT 21 09/07/2017       POC Tests: No results for input(s): POCGLU, POCNA, POCK, POCCL, POCBUN, POCHEMO, POCHCT in the last 72 hours. Coags: No results found for: PROTIME, INR, APTT    HCG (If Applicable): No results found for: PREGTESTUR, PREGSERUM, HCG, HCGQUANT     ABGs: No results found for: PHART, PO2ART, TCQ7TEM, PNW3WDJ, BEART, W2RUKAZD     Type & Screen (If Applicable):  No results found for: LABABO, LABRH    Anesthesia Evaluation   no history of anesthetic complications:   Airway: Mallampati: III  TM distance: >3 FB   Neck ROM: full  Mouth opening: > = 3 FB Dental: normal exam         Pulmonary: breath sounds clear to auscultation  (+) COPD: no interval change,  sleep apnea: on CPAP,  asthma:     (-) shortness of breath and recent URI                           Cardiovascular:    (+) hypertension:,     (-) past MI, CABG/stent, dysrhythmias,  angina and  STERN      Rhythm: regular  Rate: normal           Beta Blocker:  Not on Beta Blocker         Neuro/Psych:   (+) depression/anxiety    (-) seizures and CVA           GI/Hepatic/Renal:   (+) GERD:,      (-) liver disease       Endo/Other:    (+) : arthritis: rheumatoid and OA., .    (-) hypothyroidism, hyperthyroidism, no Type II DM, no Type I DM               Abdominal:   (+) obese,         Vascular:     - PVD, DVT and PE. Anesthesia Plan      MAC     ASA 3       Induction: intravenous. Anesthetic plan and risks discussed with patient. Plan discussed with CRNA.     Attending anesthesiologist reviewed and agrees with Pre Eval content              Alley Vasquez MD   11/15/2017

## 2017-11-15 NOTE — H&P
Historical Provider, MD   methotrexate (RHEUMATREX) 2.5 MG chemo tablet Take 15 mg by mouth once a week Indications: Takes 6 tabs (=15mg) on Wednesdays 7/24/16  Yes Historical Provider, MD   budesonide-formoterol (SYMBICORT) 160-4.5 MCG/ACT AERO Inhale 2 puffs into the lungs 2 times daily 8/17/16  Yes Lee Redding MD   azelastine (ASTELIN) 0.1 % nasal spray 2 sprays by Nasal route 2 times daily Use in each nostril as directed 10/14/15  Yes Conrad Stanley MD   loratadine (CLARITIN) 10 MG tablet Take 1 tablet by mouth daily 10/14/15  Yes Conrad Stanley MD   traMADol-acetaminophen (ULTRACET) 37.5-325 MG TABS Take 1-2 tablets by mouth 3 times daily as needed for Pain    Yes Historical Provider, MD   leflunomide (ARAVA) 20 MG tablet Take 1 tablet by mouth daily. 4/11/15  Yes Kelsi Stanley MD   acetaminophen 650 MG TABS Take 650 mg by mouth every 4 hours as needed. 1/8/15  Yes Kelsi Stanley MD   alendronate (FOSAMAX) 70 MG tablet take 1 tablet by mouth every week  Patient taking differently: take 1 tablet by mouth every week  **Friday** 12/17/14  Yes Conrad Stanley MD   albuterol (PROVENTIL HFA;VENTOLIN HFA) 108 (90 BASE) MCG/ACT inhaler Inhale 2 puffs into the lungs every 6 hours as needed. Patient taking differently: Inhale 2 puffs into the lungs every 6 hours as needed for Wheezing  8/20/14  Yes Conrad Stanley MD   fluticasone [facility]) 50 MCG/ACT nasal spray 1 spray by Nasal route daily. Patient taking differently: 1 spray by Nasal route daily as needed. 12/4/13  Yes Conrad Stanley MD   triamterene-hydrochlorothiazide (DYAZIDE) 37.5-25 MG per capsule Take 1 capsule by mouth every morning 8/22/16   Lee Redding MD   amLODIPine (NORVASC) 5 MG tablet Take 1 tablet by mouth daily 8/22/16   Lee Redding MD   potassium chloride (MICRO-K) 10 MEQ CR capsule Take 1 capsule by mouth daily.  4/11/15   Kelsi Stanley MD     Allergies  is allergic to codeine and tape  09/07/2017       IMPRESSIONS:   1. GERD AND EPIGASTRIC PAIN   1.  has a past medical history of Asthma; CHF (congestive heart failure) ([facility] [address] (2006?); Depression; GERD (gastroesophageal reflux disease); H/O scarlet fever; Hypertension; Obesity; Osteoarthritis; Rheumatoid arthritis(714.0); Sleep apnea (2011); Thyroid disease; and Ulcer ([facility] [address] PLANS:   1.  EGD    WONG DOVE ANP-BC  Electronically signed 11/15/2017 at 8:11 AM

## 2017-12-14 RX ORDER — MONTELUKAST SODIUM 10 MG/1
TABLET ORAL
Qty: 30 TABLET | Refills: 5 | Status: SHIPPED | OUTPATIENT
Start: 2017-12-14 | End: 2018-02-23 | Stop reason: SDUPTHER

## 2017-12-14 RX ORDER — DEXLANSOPRAZOLE 60 MG/1
CAPSULE, DELAYED RELEASE ORAL
Qty: 30 CAPSULE | Refills: 3 | Status: SHIPPED | OUTPATIENT
Start: 2017-12-14 | End: 2018-04-22 | Stop reason: SDUPTHER

## 2017-12-29 ENCOUNTER — HOSPITAL ENCOUNTER (INPATIENT)
Age: 62
LOS: 3 days | Discharge: HOME HEALTH | DRG: 603 | End: 2018-01-01
Attending: EMERGENCY MEDICINE | Admitting: FAMILY MEDICINE
Payer: MEDICARE

## 2017-12-29 DIAGNOSIS — L03.317 CELLULITIS, GLUTEAL: ICD-10-CM

## 2017-12-29 DIAGNOSIS — R23.8 DENUDED SKIN: Primary | ICD-10-CM

## 2017-12-29 LAB
ABSOLUTE EOS #: 0.6 K/UL (ref 0–0.4)
ABSOLUTE IMMATURE GRANULOCYTE: ABNORMAL K/UL (ref 0–0.3)
ABSOLUTE LYMPH #: 2.3 K/UL (ref 1–4.8)
ABSOLUTE MONO #: 0.8 K/UL (ref 0.2–0.8)
ALBUMIN SERPL-MCNC: 3.3 G/DL (ref 3.5–5.2)
ALBUMIN/GLOBULIN RATIO: ABNORMAL (ref 1–2.5)
ALP BLD-CCNC: 69 U/L (ref 35–104)
ALT SERPL-CCNC: 14 U/L (ref 5–33)
ANION GAP SERPL CALCULATED.3IONS-SCNC: 13 MMOL/L (ref 9–17)
AST SERPL-CCNC: 25 U/L
BASOPHILS # BLD: 1 % (ref 0–2)
BASOPHILS ABSOLUTE: 0.1 K/UL (ref 0–0.2)
BILIRUB SERPL-MCNC: 0.25 MG/DL (ref 0.3–1.2)
BILIRUBIN DIRECT: 0.08 MG/DL
BILIRUBIN, INDIRECT: 0.17 MG/DL (ref 0–1)
BUN BLDV-MCNC: 9 MG/DL (ref 8–23)
BUN/CREAT BLD: 10 (ref 9–20)
CALCIUM SERPL-MCNC: 8.8 MG/DL (ref 8.6–10.4)
CHLORIDE BLD-SCNC: 101 MMOL/L (ref 98–107)
CO2: 23 MMOL/L (ref 20–31)
CREAT SERPL-MCNC: 0.89 MG/DL (ref 0.5–0.9)
DIFFERENTIAL TYPE: ABNORMAL
EOSINOPHILS RELATIVE PERCENT: 5 % (ref 1–4)
GFR AFRICAN AMERICAN: >60 ML/MIN
GFR NON-AFRICAN AMERICAN: >60 ML/MIN
GFR SERPL CREATININE-BSD FRML MDRD: ABNORMAL ML/MIN/{1.73_M2}
GFR SERPL CREATININE-BSD FRML MDRD: ABNORMAL ML/MIN/{1.73_M2}
GLOBULIN: ABNORMAL G/DL (ref 1.5–3.8)
GLUCOSE BLD-MCNC: 177 MG/DL (ref 70–99)
HCT VFR BLD CALC: 36.6 % (ref 36–46)
HEMOGLOBIN: 11.6 G/DL (ref 12–16)
IMMATURE GRANULOCYTES: ABNORMAL %
LYMPHOCYTES # BLD: 19 % (ref 24–44)
MCH RBC QN AUTO: 28.4 PG (ref 26–34)
MCHC RBC AUTO-ENTMCNC: 31.7 G/DL (ref 31–37)
MCV RBC AUTO: 89.7 FL (ref 80–100)
MONOCYTES # BLD: 7 % (ref 1–7)
PDW BLD-RTO: 14.8 % (ref 11.5–14.5)
PLATELET # BLD: 290 K/UL (ref 130–400)
PLATELET ESTIMATE: ABNORMAL
PMV BLD AUTO: ABNORMAL FL (ref 6–12)
POTASSIUM SERPL-SCNC: 3.8 MMOL/L (ref 3.7–5.3)
RBC # BLD: 4.08 M/UL (ref 4–5.2)
RBC # BLD: ABNORMAL 10*6/UL
SEG NEUTROPHILS: 68 % (ref 36–66)
SEGMENTED NEUTROPHILS ABSOLUTE COUNT: 8.6 K/UL (ref 1.8–7.7)
SODIUM BLD-SCNC: 137 MMOL/L (ref 135–144)
TOTAL PROTEIN: 6.3 G/DL (ref 6.4–8.3)
WBC # BLD: 12.4 K/UL (ref 3.5–11)
WBC # BLD: ABNORMAL 10*3/UL

## 2017-12-29 PROCEDURE — 2580000003 HC RX 258: Performed by: EMERGENCY MEDICINE

## 2017-12-29 PROCEDURE — 90471 IMMUNIZATION ADMIN: CPT | Performed by: EMERGENCY MEDICINE

## 2017-12-29 PROCEDURE — 6370000000 HC RX 637 (ALT 250 FOR IP): Performed by: FAMILY MEDICINE

## 2017-12-29 PROCEDURE — 90715 TDAP VACCINE 7 YRS/> IM: CPT | Performed by: EMERGENCY MEDICINE

## 2017-12-29 PROCEDURE — 87040 BLOOD CULTURE FOR BACTERIA: CPT

## 2017-12-29 PROCEDURE — 80048 BASIC METABOLIC PNL TOTAL CA: CPT

## 2017-12-29 PROCEDURE — 85025 COMPLETE CBC W/AUTO DIFF WBC: CPT

## 2017-12-29 PROCEDURE — 80076 HEPATIC FUNCTION PANEL: CPT

## 2017-12-29 PROCEDURE — 36415 COLL VENOUS BLD VENIPUNCTURE: CPT

## 2017-12-29 PROCEDURE — 1200000000 HC SEMI PRIVATE

## 2017-12-29 PROCEDURE — 96372 THER/PROPH/DIAG INJ SC/IM: CPT

## 2017-12-29 PROCEDURE — 2500000003 HC RX 250 WO HCPCS: Performed by: EMERGENCY MEDICINE

## 2017-12-29 PROCEDURE — 6360000002 HC RX W HCPCS: Performed by: EMERGENCY MEDICINE

## 2017-12-29 PROCEDURE — S0028 INJECTION, FAMOTIDINE, 20 MG: HCPCS | Performed by: FAMILY MEDICINE

## 2017-12-29 PROCEDURE — 2500000003 HC RX 250 WO HCPCS: Performed by: FAMILY MEDICINE

## 2017-12-29 PROCEDURE — 99284 EMERGENCY DEPT VISIT MOD MDM: CPT

## 2017-12-29 RX ORDER — SODIUM CHLORIDE 0.9 % (FLUSH) 0.9 %
10 SYRINGE (ML) INJECTION PRN
Status: DISCONTINUED | OUTPATIENT
Start: 2017-12-29 | End: 2018-01-01 | Stop reason: HOSPADM

## 2017-12-29 RX ORDER — HYDROMORPHONE HCL 110MG/55ML
2 PATIENT CONTROLLED ANALGESIA SYRINGE INTRAVENOUS
Status: DISCONTINUED | OUTPATIENT
Start: 2017-12-29 | End: 2018-01-01 | Stop reason: HOSPADM

## 2017-12-29 RX ORDER — POTASSIUM CHLORIDE 7.45 MG/ML
10 INJECTION INTRAVENOUS PRN
Status: DISCONTINUED | OUTPATIENT
Start: 2017-12-29 | End: 2018-01-01 | Stop reason: HOSPADM

## 2017-12-29 RX ORDER — FOLIC ACID 1 MG/1
1 TABLET ORAL DAILY
Status: DISCONTINUED | OUTPATIENT
Start: 2017-12-30 | End: 2018-01-01 | Stop reason: HOSPADM

## 2017-12-29 RX ORDER — VANCOMYCIN HYDROCHLORIDE 1 G/200ML
1000 INJECTION, SOLUTION INTRAVENOUS ONCE
Status: COMPLETED | OUTPATIENT
Start: 2017-12-29 | End: 2017-12-29

## 2017-12-29 RX ORDER — ALBUTEROL SULFATE 2.5 MG/3ML
2.5 SOLUTION RESPIRATORY (INHALATION) EVERY 6 HOURS PRN
Status: DISCONTINUED | OUTPATIENT
Start: 2017-12-29 | End: 2018-01-01 | Stop reason: HOSPADM

## 2017-12-29 RX ORDER — MAGNESIUM SULFATE 1 G/100ML
1 INJECTION INTRAVENOUS PRN
Status: DISCONTINUED | OUTPATIENT
Start: 2017-12-29 | End: 2018-01-01 | Stop reason: HOSPADM

## 2017-12-29 RX ORDER — AMLODIPINE BESYLATE 5 MG/1
5 TABLET ORAL DAILY
Status: DISCONTINUED | OUTPATIENT
Start: 2017-12-30 | End: 2018-01-01 | Stop reason: HOSPADM

## 2017-12-29 RX ORDER — ACETAMINOPHEN 325 MG/1
650 TABLET ORAL EVERY 4 HOURS PRN
Status: DISCONTINUED | OUTPATIENT
Start: 2017-12-29 | End: 2018-01-01 | Stop reason: HOSPADM

## 2017-12-29 RX ORDER — ONDANSETRON 2 MG/ML
4 INJECTION INTRAMUSCULAR; INTRAVENOUS ONCE
Status: COMPLETED | OUTPATIENT
Start: 2017-12-29 | End: 2017-12-29

## 2017-12-29 RX ORDER — LEFLUNOMIDE 10 MG/1
20 TABLET ORAL DAILY
Status: DISCONTINUED | OUTPATIENT
Start: 2017-12-30 | End: 2018-01-01 | Stop reason: HOSPADM

## 2017-12-29 RX ORDER — MONTELUKAST SODIUM 10 MG/1
10 TABLET ORAL NIGHTLY
Status: DISCONTINUED | OUTPATIENT
Start: 2017-12-29 | End: 2018-01-01 | Stop reason: HOSPADM

## 2017-12-29 RX ORDER — ONDANSETRON 2 MG/ML
4 INJECTION INTRAMUSCULAR; INTRAVENOUS EVERY 6 HOURS PRN
Status: DISCONTINUED | OUTPATIENT
Start: 2017-12-29 | End: 2018-01-01 | Stop reason: HOSPADM

## 2017-12-29 RX ORDER — SODIUM CHLORIDE 0.9 % (FLUSH) 0.9 %
10 SYRINGE (ML) INJECTION EVERY 12 HOURS SCHEDULED
Status: DISCONTINUED | OUTPATIENT
Start: 2017-12-29 | End: 2018-01-01 | Stop reason: HOSPADM

## 2017-12-29 RX ORDER — SODIUM CHLORIDE 9 MG/ML
INJECTION, SOLUTION INTRAVENOUS CONTINUOUS
Status: DISCONTINUED | OUTPATIENT
Start: 2017-12-29 | End: 2018-01-01 | Stop reason: HOSPADM

## 2017-12-29 RX ADMIN — VANCOMYCIN HYDROCHLORIDE 1000 MG: 1 INJECTION, SOLUTION INTRAVENOUS at 22:15

## 2017-12-29 RX ADMIN — FAMOTIDINE 20 MG: 10 INJECTION, SOLUTION INTRAVENOUS at 22:14

## 2017-12-29 RX ADMIN — TAZOBACTAM SODIUM AND PIPERACILLIN SODIUM 3.38 G: 375; 3 INJECTION, SOLUTION INTRAVENOUS at 20:42

## 2017-12-29 RX ADMIN — MONTELUKAST SODIUM 10 MG: 10 TABLET, FILM COATED ORAL at 22:15

## 2017-12-29 RX ADMIN — ONDANSETRON 4 MG: 2 INJECTION INTRAMUSCULAR; INTRAVENOUS at 20:36

## 2017-12-29 RX ADMIN — TETANUS TOXOID, REDUCED DIPHTHERIA TOXOID AND ACELLULAR PERTUSSIS VACCINE, ADSORBED 0.5 ML: 5; 2.5; 8; 8; 2.5 SUSPENSION INTRAMUSCULAR at 20:37

## 2017-12-29 RX ADMIN — Medication 1 MG: at 20:36

## 2017-12-29 RX ADMIN — SODIUM CHLORIDE: 9 INJECTION, SOLUTION INTRAVENOUS at 20:42

## 2017-12-29 RX ADMIN — TETANUS IMMUNE GLOBULIN (HUMAN) 250 UNITS: 250 INJECTION INTRAMUSCULAR at 20:37

## 2017-12-29 ASSESSMENT — PAIN SCALES - WONG BAKER: WONGBAKER_NUMERICALRESPONSE: 10

## 2017-12-29 ASSESSMENT — ENCOUNTER SYMPTOMS: COLOR CHANGE: 1

## 2017-12-29 ASSESSMENT — PAIN SCALES - GENERAL: PAINLEVEL_OUTOF10: 10

## 2017-12-29 ASSESSMENT — PAIN DESCRIPTION - LOCATION: LOCATION: BUTTOCKS

## 2017-12-30 PROBLEM — M06.89 OTHER SPECIFIED RHEUMATOID ARTHRITIS, MULTIPLE SITES (HCC): Status: ACTIVE | Noted: 2017-12-30

## 2017-12-30 PROBLEM — L03.317 CELLULITIS AND ABSCESS OF BUTTOCK: Status: ACTIVE | Noted: 2017-12-30

## 2017-12-30 PROBLEM — E88.09 HYPOALBUMINEMIA: Status: ACTIVE | Noted: 2017-12-30

## 2017-12-30 PROBLEM — Z79.52 ON PREDNISONE THERAPY: Status: ACTIVE | Noted: 2017-12-30

## 2017-12-30 PROBLEM — D72.829 LEUKOCYTOSIS: Status: ACTIVE | Noted: 2017-12-30

## 2017-12-30 PROBLEM — L02.31 CELLULITIS AND ABSCESS OF BUTTOCK: Status: ACTIVE | Noted: 2017-12-30

## 2017-12-30 PROBLEM — Z79.631 LONG TERM METHOTREXATE USER: Status: ACTIVE | Noted: 2017-12-30

## 2017-12-30 LAB
ABSOLUTE EOS #: 0.6 K/UL (ref 0–0.4)
ABSOLUTE IMMATURE GRANULOCYTE: ABNORMAL K/UL (ref 0–0.3)
ABSOLUTE LYMPH #: 3.1 K/UL (ref 1–4.8)
ABSOLUTE MONO #: 1.4 K/UL (ref 0.2–0.8)
ANION GAP SERPL CALCULATED.3IONS-SCNC: 13 MMOL/L (ref 9–17)
BASOPHILS # BLD: 0 % (ref 0–2)
BASOPHILS ABSOLUTE: 0 K/UL (ref 0–0.2)
BUN BLDV-MCNC: 7 MG/DL (ref 8–23)
BUN/CREAT BLD: 9 (ref 9–20)
CALCIUM SERPL-MCNC: 8.9 MG/DL (ref 8.6–10.4)
CHLORIDE BLD-SCNC: 102 MMOL/L (ref 98–107)
CO2: 25 MMOL/L (ref 20–31)
CREAT SERPL-MCNC: 0.8 MG/DL (ref 0.5–0.9)
DIFFERENTIAL TYPE: ABNORMAL
EOSINOPHILS RELATIVE PERCENT: 5 % (ref 1–4)
GFR AFRICAN AMERICAN: >60 ML/MIN
GFR NON-AFRICAN AMERICAN: >60 ML/MIN
GFR SERPL CREATININE-BSD FRML MDRD: ABNORMAL ML/MIN/{1.73_M2}
GFR SERPL CREATININE-BSD FRML MDRD: ABNORMAL ML/MIN/{1.73_M2}
GLUCOSE BLD-MCNC: 108 MG/DL (ref 70–99)
HCT VFR BLD CALC: 34.3 % (ref 36–46)
HEMOGLOBIN: 11 G/DL (ref 12–16)
IMMATURE GRANULOCYTES: ABNORMAL %
LYMPHOCYTES # BLD: 26 % (ref 24–44)
MCH RBC QN AUTO: 29.1 PG (ref 26–34)
MCHC RBC AUTO-ENTMCNC: 32.2 G/DL (ref 31–37)
MCV RBC AUTO: 90.3 FL (ref 80–100)
MONOCYTES # BLD: 11 % (ref 1–7)
PDW BLD-RTO: 14.6 % (ref 11.5–14.5)
PLATELET # BLD: 282 K/UL (ref 130–400)
PLATELET ESTIMATE: ABNORMAL
PMV BLD AUTO: ABNORMAL FL (ref 6–12)
POTASSIUM SERPL-SCNC: 3.9 MMOL/L (ref 3.7–5.3)
RBC # BLD: 3.79 M/UL (ref 4–5.2)
RBC # BLD: ABNORMAL 10*6/UL
SEG NEUTROPHILS: 58 % (ref 36–66)
SEGMENTED NEUTROPHILS ABSOLUTE COUNT: 7 K/UL (ref 1.8–7.7)
SODIUM BLD-SCNC: 140 MMOL/L (ref 135–144)
WBC # BLD: 12.1 K/UL (ref 3.5–11)
WBC # BLD: ABNORMAL 10*3/UL

## 2017-12-30 PROCEDURE — 6370000000 HC RX 637 (ALT 250 FOR IP): Performed by: SURGERY

## 2017-12-30 PROCEDURE — 94640 AIRWAY INHALATION TREATMENT: CPT

## 2017-12-30 PROCEDURE — 36415 COLL VENOUS BLD VENIPUNCTURE: CPT

## 2017-12-30 PROCEDURE — 2580000003 HC RX 258: Performed by: FAMILY MEDICINE

## 2017-12-30 PROCEDURE — 6370000000 HC RX 637 (ALT 250 FOR IP): Performed by: FAMILY MEDICINE

## 2017-12-30 PROCEDURE — 85025 COMPLETE CBC W/AUTO DIFF WBC: CPT

## 2017-12-30 PROCEDURE — 1200000000 HC SEMI PRIVATE

## 2017-12-30 PROCEDURE — 6360000002 HC RX W HCPCS: Performed by: FAMILY MEDICINE

## 2017-12-30 PROCEDURE — 80048 BASIC METABOLIC PNL TOTAL CA: CPT

## 2017-12-30 PROCEDURE — 94760 N-INVAS EAR/PLS OXIMETRY 1: CPT

## 2017-12-30 PROCEDURE — S0028 INJECTION, FAMOTIDINE, 20 MG: HCPCS | Performed by: FAMILY MEDICINE

## 2017-12-30 PROCEDURE — 99223 1ST HOSP IP/OBS HIGH 75: CPT | Performed by: FAMILY MEDICINE

## 2017-12-30 PROCEDURE — 2580000003 HC RX 258: Performed by: EMERGENCY MEDICINE

## 2017-12-30 PROCEDURE — 99223 1ST HOSP IP/OBS HIGH 75: CPT | Performed by: INTERNAL MEDICINE

## 2017-12-30 PROCEDURE — 2500000003 HC RX 250 WO HCPCS: Performed by: FAMILY MEDICINE

## 2017-12-30 RX ORDER — GINSENG 100 MG
CAPSULE ORAL 2 TIMES DAILY
Status: DISCONTINUED | OUTPATIENT
Start: 2017-12-30 | End: 2018-01-01 | Stop reason: HOSPADM

## 2017-12-30 RX ADMIN — VANCOMYCIN HYDROCHLORIDE 1500 MG: 500 INJECTION, POWDER, LYOPHILIZED, FOR SOLUTION INTRAVENOUS at 09:24

## 2017-12-30 RX ADMIN — Medication 10 ML: at 20:07

## 2017-12-30 RX ADMIN — MONTELUKAST SODIUM 10 MG: 10 TABLET, FILM COATED ORAL at 22:42

## 2017-12-30 RX ADMIN — LEFLUNOMIDE 20 MG: 10 TABLET, FILM COATED ORAL at 08:58

## 2017-12-30 RX ADMIN — HYDROMORPHONE HYDROCHLORIDE 2 MG: 2 INJECTION, SOLUTION INTRAMUSCULAR; INTRAVENOUS; SUBCUTANEOUS at 09:07

## 2017-12-30 RX ADMIN — Medication 10 ML: at 08:59

## 2017-12-30 RX ADMIN — HYDROMORPHONE HYDROCHLORIDE 2 MG: 2 INJECTION, SOLUTION INTRAMUSCULAR; INTRAVENOUS; SUBCUTANEOUS at 13:48

## 2017-12-30 RX ADMIN — BACITRACIN: 500 OINTMENT TOPICAL at 23:00

## 2017-12-30 RX ADMIN — TAZOBACTAM SODIUM AND PIPERACILLIN SODIUM 3.38 G: 375; 3 INJECTION, SOLUTION INTRAVENOUS at 22:40

## 2017-12-30 RX ADMIN — FAMOTIDINE 20 MG: 10 INJECTION, SOLUTION INTRAVENOUS at 08:59

## 2017-12-30 RX ADMIN — SODIUM CHLORIDE: 9 INJECTION, SOLUTION INTRAVENOUS at 13:25

## 2017-12-30 RX ADMIN — Medication 1 MG: at 00:37

## 2017-12-30 RX ADMIN — AMLODIPINE BESYLATE 5 MG: 5 TABLET ORAL at 08:58

## 2017-12-30 RX ADMIN — FOLIC ACID 1 MG: 1 TABLET ORAL at 08:58

## 2017-12-30 RX ADMIN — HYDROMORPHONE HYDROCHLORIDE 2 MG: 2 INJECTION, SOLUTION INTRAMUSCULAR; INTRAVENOUS; SUBCUTANEOUS at 19:59

## 2017-12-30 RX ADMIN — TAZOBACTAM SODIUM AND PIPERACILLIN SODIUM 3.38 G: 375; 3 INJECTION, SOLUTION INTRAVENOUS at 13:27

## 2017-12-30 RX ADMIN — Medication 1 MG: at 03:41

## 2017-12-30 RX ADMIN — FAMOTIDINE 20 MG: 10 INJECTION, SOLUTION INTRAVENOUS at 20:07

## 2017-12-30 RX ADMIN — MOMETASONE FUROATE AND FORMOTEROL FUMARATE DIHYDRATE 2 PUFF: 200; 5 AEROSOL RESPIRATORY (INHALATION) at 09:40

## 2017-12-30 RX ADMIN — VANCOMYCIN HYDROCHLORIDE 1500 MG: 500 INJECTION, POWDER, LYOPHILIZED, FOR SOLUTION INTRAVENOUS at 20:07

## 2017-12-30 RX ADMIN — BACITRACIN: 500 OINTMENT TOPICAL at 13:24

## 2017-12-30 RX ADMIN — TAZOBACTAM SODIUM AND PIPERACILLIN SODIUM 3.38 G: 375; 3 INJECTION, SOLUTION INTRAVENOUS at 03:33

## 2017-12-30 RX ADMIN — HYDROMORPHONE HYDROCHLORIDE 2 MG: 2 INJECTION, SOLUTION INTRAMUSCULAR; INTRAVENOUS; SUBCUTANEOUS at 23:00

## 2017-12-30 RX ADMIN — ENOXAPARIN SODIUM 40 MG: 40 INJECTION SUBCUTANEOUS at 08:59

## 2017-12-30 ASSESSMENT — PAIN DESCRIPTION - ORIENTATION
ORIENTATION: RIGHT;LEFT
ORIENTATION: LOWER;POSTERIOR
ORIENTATION: RIGHT;LEFT;LOWER
ORIENTATION: LOWER;POSTERIOR
ORIENTATION: RIGHT;LEFT

## 2017-12-30 ASSESSMENT — PAIN DESCRIPTION - PROGRESSION
CLINICAL_PROGRESSION: GRADUALLY WORSENING
CLINICAL_PROGRESSION: NOT CHANGED
CLINICAL_PROGRESSION: GRADUALLY IMPROVING
CLINICAL_PROGRESSION: GRADUALLY WORSENING

## 2017-12-30 ASSESSMENT — PAIN DESCRIPTION - PAIN TYPE
TYPE: ACUTE PAIN

## 2017-12-30 ASSESSMENT — PAIN SCALES - GENERAL
PAINLEVEL_OUTOF10: 7
PAINLEVEL_OUTOF10: 8
PAINLEVEL_OUTOF10: 5
PAINLEVEL_OUTOF10: 5
PAINLEVEL_OUTOF10: 8
PAINLEVEL_OUTOF10: 6
PAINLEVEL_OUTOF10: 4
PAINLEVEL_OUTOF10: 7
PAINLEVEL_OUTOF10: 10
PAINLEVEL_OUTOF10: 6
PAINLEVEL_OUTOF10: 10

## 2017-12-30 ASSESSMENT — PAIN DESCRIPTION - FREQUENCY
FREQUENCY: CONTINUOUS
FREQUENCY: INTERMITTENT
FREQUENCY: CONTINUOUS
FREQUENCY: INTERMITTENT

## 2017-12-30 ASSESSMENT — PAIN DESCRIPTION - DESCRIPTORS
DESCRIPTORS: ACHING;BURNING;DISCOMFORT
DESCRIPTORS: CONSTANT;BURNING
DESCRIPTORS: ACHING;BURNING
DESCRIPTORS: BURNING
DESCRIPTORS: ACHING;BURNING;DISCOMFORT
DESCRIPTORS: BURNING

## 2017-12-30 ASSESSMENT — PAIN DESCRIPTION - ONSET
ONSET: ON-GOING

## 2017-12-30 ASSESSMENT — PAIN DESCRIPTION - LOCATION
LOCATION: BUTTOCKS
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
## 2017-12-30 NOTE — H&P
Status post fall with trauma to the buttocks  History of present illness  69-year-old obese -American female with underlying history significant for rheumatoid arthritis on chronic prednisone, Arava and methotrexate is presented after she slipped and took a fall on her buttocks that happened on [place]. She reports isolated injury. Patient stated that she's been having increasing pain redness on her buttocks  She denies head and neck injury. Currently she is wide awake alert, afebrile HEENT well controlled  Past medical history  Rheumatoid arthritis  Obesity  Sleep apnea  Hypertension  Past surgical history noncontributory  Medications list reviewed  Allergies per list reviewed  Social history negative for tobacco alcohol or illicit drug use  Family history reviewed nothing relevant  Review of systems all 12 systems reviewed. History of present illness  Vitals afebrile hemodynamically stable  Systemic exam  HEENT nontraumatic  Neck full active range of motion without restriction  Lungs good bilateral air entry. Auscultation  Chest symmetrical bilateral air expansion with no skin change , no palpatory tenderness  CVS S1-S2 regular rate and rhythm  Abdomen obese soft and scaphoid benign to palpation normal bowel sounds  CNS awake and alert ×3, cranial nerves II through XII grossly intact. No obvious acute focal neurological deficit  Lower extremities no apparent sign of external trauma. Unremarkable, Positive Pedal Pulses  Labs  CMP within Normal Limits, Blood Sugar 108, WBC 12.1 H&H 11.0 and 34.3 Respectively Platelets 834, Albumin 3.3 Otherwise Liver Function Tests Unremarkable  Assessment/  Status Post Fall with Contusion, Superficial Cellulitis of Buttocks. Pain Is Well Controlled Patient Has Been Evaluated by General Surgery, No Apparent Surgical Concentration Has Been Recommended at This Time. Patient Will Be Evaluated by Infectious Disease.   Currently We Will Continue Vancomycin and

## 2017-12-30 NOTE — PROGRESS NOTES
Pharmacy Note  Vancomycin Consult - Initial Note    Darryl Ying is a 60 y.o. female ordered Vancomycin for cellulitis; consult received from Dr. Christina Witt to manage therapy. Also receiving Zosyn. Patient Active Problem List   Diagnosis    Ulcer ([facility] [address]    Rheumatoid arthritis involving multiple sites ([facility] [address]    Osteoporosis    GERD (gastroesophageal reflux disease)    Hypertension    Asthma    Allergic rhinitis    Acute bronchitis    Iron deficiency anemia due to chronic blood loss    Obstructive sleep apnea syndrome    COPD exacerbation (HCC)    Rheumatoid aortitis    NICOLASA on CPAP    Diarrhea    Hiatal hernia    Denuded skin       Actual Weight:    Wt Readings from Last 1 Encounters:   12/29/17 241 lb 9.6 oz (109.6 kg)     CrCl:  Estimated Creatinine Clearance: 77 mL/min (based on SCr of 0.89 mg/dL). Height:     Wt Readings from Last 1 Encounters:   12/29/17 241 lb 9.6 oz (109.6 kg)       Allergies:  Codeine and Tape [adhesive tape]     Last Temp:   Temp Readings from Last 3 Encounters:   12/29/17 98.8 °F (37.1 °C)   11/15/17 97.2 °F (36.2 °C) (Temporal)   05/02/17 98.2 °F (36.8 °C) (Oral)       Recent Labs      12/29/17   2255   BUN  9       Recent Labs      12/29/17   2255   CREATININE  0.89       Recent Labs      12/29/17   2255   WBC  12.4*       No intake or output data in the 24 hours ending 12/30/17 0016      GOAL TROUGH:     15-20 mcg/ml for bacteremia, endocarditis, osteomyelitis, meningitis, hospital-acquired pneumonia caused by Staphylococcus aureus       10-20 mcg/ml skin and soft tissue infections, other infections not listed above         Assessment/Plan:  Will initiate vancomycin 1500 mg IV every 12 hours. First dose will be scheduled on 12/30 at 0800 since pt received 1g at 2200 on 12/29 and I am timing based on Zosyn infusions as well. Timing of trough level will be determined based on culture results, renal function, and clinical response. Thank you for the consult.
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
## 2017-12-31 LAB
BUN BLDV-MCNC: 4 MG/DL (ref 8–23)
CREAT SERPL-MCNC: 0.67 MG/DL (ref 0.5–0.9)
GFR AFRICAN AMERICAN: >60 ML/MIN
GFR NON-AFRICAN AMERICAN: >60 ML/MIN
GFR SERPL CREATININE-BSD FRML MDRD: ABNORMAL ML/MIN/{1.73_M2}
GFR SERPL CREATININE-BSD FRML MDRD: ABNORMAL ML/MIN/{1.73_M2}

## 2017-12-31 PROCEDURE — 6370000000 HC RX 637 (ALT 250 FOR IP): Performed by: FAMILY MEDICINE

## 2017-12-31 PROCEDURE — 2580000003 HC RX 258: Performed by: FAMILY MEDICINE

## 2017-12-31 PROCEDURE — 84520 ASSAY OF UREA NITROGEN: CPT

## 2017-12-31 PROCEDURE — S0028 INJECTION, FAMOTIDINE, 20 MG: HCPCS | Performed by: FAMILY MEDICINE

## 2017-12-31 PROCEDURE — 94760 N-INVAS EAR/PLS OXIMETRY 1: CPT

## 2017-12-31 PROCEDURE — 6360000002 HC RX W HCPCS: Performed by: FAMILY MEDICINE

## 2017-12-31 PROCEDURE — 2580000003 HC RX 258: Performed by: EMERGENCY MEDICINE

## 2017-12-31 PROCEDURE — 36415 COLL VENOUS BLD VENIPUNCTURE: CPT

## 2017-12-31 PROCEDURE — 82565 ASSAY OF CREATININE: CPT

## 2017-12-31 PROCEDURE — 99232 SBSQ HOSP IP/OBS MODERATE 35: CPT | Performed by: FAMILY MEDICINE

## 2017-12-31 PROCEDURE — 1200000000 HC SEMI PRIVATE

## 2017-12-31 PROCEDURE — 94640 AIRWAY INHALATION TREATMENT: CPT

## 2017-12-31 PROCEDURE — 2500000003 HC RX 250 WO HCPCS: Performed by: FAMILY MEDICINE

## 2017-12-31 PROCEDURE — 99233 SBSQ HOSP IP/OBS HIGH 50: CPT | Performed by: INTERNAL MEDICINE

## 2017-12-31 RX ORDER — PREDNISONE 1 MG/1
5 TABLET ORAL DAILY
Status: DISCONTINUED | OUTPATIENT
Start: 2018-01-01 | End: 2018-01-01 | Stop reason: HOSPADM

## 2017-12-31 RX ORDER — PREDNISONE 10 MG/1
10 TABLET ORAL ONCE
Status: COMPLETED | OUTPATIENT
Start: 2017-12-31 | End: 2017-12-31

## 2017-12-31 RX ADMIN — HYDROMORPHONE HYDROCHLORIDE 2 MG: 2 INJECTION, SOLUTION INTRAMUSCULAR; INTRAVENOUS; SUBCUTANEOUS at 12:14

## 2017-12-31 RX ADMIN — FOLIC ACID 1 MG: 1 TABLET ORAL at 09:54

## 2017-12-31 RX ADMIN — TAZOBACTAM SODIUM AND PIPERACILLIN SODIUM 3.38 G: 375; 3 INJECTION, SOLUTION INTRAVENOUS at 14:48

## 2017-12-31 RX ADMIN — PREDNISONE 10 MG: 10 TABLET ORAL at 21:41

## 2017-12-31 RX ADMIN — MONTELUKAST SODIUM 10 MG: 10 TABLET, FILM COATED ORAL at 21:43

## 2017-12-31 RX ADMIN — Medication 1 MG: at 18:38

## 2017-12-31 RX ADMIN — Medication 10 ML: at 07:35

## 2017-12-31 RX ADMIN — ONDANSETRON 4 MG: 2 INJECTION INTRAMUSCULAR; INTRAVENOUS at 02:28

## 2017-12-31 RX ADMIN — MOMETASONE FUROATE AND FORMOTEROL FUMARATE DIHYDRATE 2 PUFF: 200; 5 AEROSOL RESPIRATORY (INHALATION) at 08:11

## 2017-12-31 RX ADMIN — MOMETASONE FUROATE AND FORMOTEROL FUMARATE DIHYDRATE 2 PUFF: 200; 5 AEROSOL RESPIRATORY (INHALATION) at 20:29

## 2017-12-31 RX ADMIN — HYDROMORPHONE HYDROCHLORIDE 2 MG: 2 INJECTION, SOLUTION INTRAMUSCULAR; INTRAVENOUS; SUBCUTANEOUS at 07:28

## 2017-12-31 RX ADMIN — LEFLUNOMIDE 20 MG: 10 TABLET, FILM COATED ORAL at 09:53

## 2017-12-31 RX ADMIN — FAMOTIDINE 20 MG: 10 INJECTION, SOLUTION INTRAVENOUS at 21:41

## 2017-12-31 RX ADMIN — BACITRACIN: 500 OINTMENT TOPICAL at 10:04

## 2017-12-31 RX ADMIN — FAMOTIDINE 20 MG: 10 INJECTION, SOLUTION INTRAVENOUS at 09:54

## 2017-12-31 RX ADMIN — TAZOBACTAM SODIUM AND PIPERACILLIN SODIUM 3.38 G: 375; 3 INJECTION, SOLUTION INTRAVENOUS at 06:36

## 2017-12-31 RX ADMIN — SODIUM CHLORIDE: 9 INJECTION, SOLUTION INTRAVENOUS at 14:47

## 2017-12-31 RX ADMIN — ONDANSETRON 4 MG: 2 INJECTION INTRAMUSCULAR; INTRAVENOUS at 18:48

## 2017-12-31 RX ADMIN — ENOXAPARIN SODIUM 40 MG: 40 INJECTION SUBCUTANEOUS at 09:53

## 2017-12-31 RX ADMIN — AMLODIPINE BESYLATE 5 MG: 5 TABLET ORAL at 09:54

## 2017-12-31 ASSESSMENT — PAIN DESCRIPTION - FREQUENCY
FREQUENCY: CONTINUOUS

## 2017-12-31 ASSESSMENT — PAIN DESCRIPTION - LOCATION: LOCATION: ANKLE;FOOT;WRIST

## 2017-12-31 ASSESSMENT — PAIN SCALES - GENERAL
PAINLEVEL_OUTOF10: 8
PAINLEVEL_OUTOF10: 6
PAINLEVEL_OUTOF10: 8
PAINLEVEL_OUTOF10: 5
PAINLEVEL_OUTOF10: 10
PAINLEVEL_OUTOF10: 5

## 2017-12-31 ASSESSMENT — PAIN DESCRIPTION - ONSET
ONSET: PROGRESSIVE
ONSET: ON-GOING

## 2017-12-31 ASSESSMENT — PAIN DESCRIPTION - PAIN TYPE
TYPE: ACUTE PAIN;CHRONIC PAIN

## 2017-12-31 ASSESSMENT — PAIN DESCRIPTION - PROGRESSION
CLINICAL_PROGRESSION: RAPIDLY IMPROVING
CLINICAL_PROGRESSION: RAPIDLY WORSENING
CLINICAL_PROGRESSION: GRADUALLY IMPROVING
CLINICAL_PROGRESSION: GRADUALLY IMPROVING

## 2017-12-31 ASSESSMENT — PAIN DESCRIPTION - DESCRIPTORS
DESCRIPTORS: ACHING;DISCOMFORT;PRESSURE
DESCRIPTORS: ACHING;DISCOMFORT;THROBBING
DESCRIPTORS: ACHING;DISCOMFORT

## 2017-12-31 ASSESSMENT — PAIN DESCRIPTION - ORIENTATION
ORIENTATION: LEFT
ORIENTATION: POSTERIOR

## 2017-12-31 NOTE — FLOWSHEET NOTE
Present were patient + 2 visitors. Patient expresses no needs or visit  at this time. Follow up as needed.      12/31/17 1524   Encounter Summary   Services provided to: Patient   Referral/Consult From: Walt [facility] Visiting ([id number])   Complexity of Encounter Low   Length of Encounter 15 minutes   Spiritual Assessment Completed Yes   Routine   Type Initial   Assessment Passive   Intervention Explored feelings, thoughts, concerns   Outcome Refused/declined
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
## 2017-12-31 NOTE — PROGRESS NOTES
Progress Note    Subjective: The patient is awake and alert. No problems overnight. Denies chest pain, angina, and dyspnea. Denies abdominal pain. Tolerating diet. No nausea or vomiting. Admit Date:  12/29/2017    Patient Seen, Chart, Labs, Radiology studies, and Consults reviewed. Objective:   BP (!) 100/56   Pulse 70   Temp 98.6 °F (37 °C) (Oral)   Resp 18   Ht 5' 3\" (1.6 m)   Wt 241 lb 9.6 oz (109.6 kg)   SpO2 93%   BMI 42.80 kg/m²     Intake/Output Summary (Last 24 hours) at 12/31/17 1350  Last data filed at 12/31/17 0737   Gross per 24 hour   Intake             1572 ml   Output              750 ml   Net              822 ml     General appearance - alert, well appearing, and in no distress and oriented to person, place, and time  Heart:  RRR, no murmurs, gallops, or rubs, extrasystoles. Lungs:  CTA bilaterally, no wheeze, rales or rhonchi, good air entry, good respiratory effort  Abd: bowel sounds present, nontender, nondistended, no masses, no rigidity, no guarding, no peritoneal signs.   Integumentary: Skin good color, good turgor, no rashes, no acute lesions  Upper Extrem:  No clubbing bilateral hands, no cyanosis or edema  Lower Extrem:no cyanosis or edema, no calf tenderness to lower extremities  Neurological - alert, oriented, normal speech, no focal findings or movement disorder noted, neck supple without rigidity, motor and sensory grossly normal bilaterally      Medications:    bacitracin   Topical BID    sodium chloride flush  10 mL Intravenous 2 times per day    famotidine (PEPCID) injection  20 mg Intravenous BID    piperacillin-tazobactam  3.375 g Intravenous Q8H    amLODIPine  5 mg Oral Daily    mometasone-formoterol  2 puff Inhalation BID    folic acid  1 mg Oral Daily    leflunomide  20 mg Oral Daily    montelukast  10 mg Oral Nightly    enoxaparin  40 mg Subcutaneous Daily       Data:  CBC: Recent Labs      12/29/17   2255  12/30/17   0709   WBC  12.4*  12.1*   RBC 4. 08  3.79*   HGB  11.6*  11.0*   HCT  36.6  34.3*   MCV  89.7  90.3   RDW  14.8*  14.6*   PLT  290  282     BMP: Recent Labs      12/29/17   2255  12/30/17   0709  12/31/17   0630   NA  137  140   --    K  3.8  3.9   --    CL  101  102   --    CO2  23  25   --    BUN  9  7*  4*   CREATININE  0.89  0.80  0.67     BNP: No results for input(s): BNP in the last 72 hours. PT/INR: No results for input(s): PROTIME, INR in the last 72 hours. APTT: No results for input(s): APTT in the last 72 hours. CARDIAC ENZYMES: No results for input(s): CKMB, CKMBINDEX, TROPONINI in the last 72 hours. Invalid input(s): CKTOTAL;3  FASTING LIPID PANEL:  Lab Results   Component Value Date    CHOL 195 09/07/2017     09/07/2017    TRIG 74 09/07/2017     LIVER PROFILE: Recent Labs      12/29/17   2255   AST  25   ALT  14   BILIDIR  0.08   BILITOT  0.25*   ALKPHOS  69        Assessment/Plan:  Principal Problem:    Cellulitis and abscess of buttock  Active Problems:    Denuded skin    Other specified rheumatoid arthritis, multiple sites ([facility] [address]    On prednisone therapy    Long term methotrexate user    Hypoalbuminemia    Leukocytosis  Complaint of acute and chronic wrist pain with underlying history of rheumatoid arthritis. She is prednisone dependent has been initially put on hold at the time of admission because of suspected cellulitis of buttocks. We will resume her prednisone. She is already on Arava and methotrexate and tramadol per rheumatology  Patient is provided with a prescription for home use, Percocet 5/325 one tablet by mouth 3 times a day #20. Prescription given to RN  No surgical option as per surgery. Blood cultures are negative, afebrile hemodynamically stable. The patient is cleared by infectious disease, she is medically stable to be discharged home with home health care. Antibiotics for home use to be prescribed by infectious disease.   Currently she is on intravenous Zosyn, vancomycin has been DC'd  Hemodynamically stable  Encouragement provided, resume ambulation as tolerated  Discharge plan discussed with nursing staff  Would follow along infectious disease  This note is created with a voice recognition program and while intend to generate a document that accurately reflects the content of the visit, no guarantee can be provided that every mistake has been identified and corrected by editing.                       Champ Medrano MD 12/31/2017 1:50 PM

## 2018-01-01 VITALS
HEIGHT: 63 IN | WEIGHT: 241.6 LBS | OXYGEN SATURATION: 95 % | TEMPERATURE: 98.2 F | DIASTOLIC BLOOD PRESSURE: 80 MMHG | HEART RATE: 79 BPM | BODY MASS INDEX: 42.81 KG/M2 | SYSTOLIC BLOOD PRESSURE: 148 MMHG | RESPIRATION RATE: 19 BRPM

## 2018-01-01 PROCEDURE — 6360000002 HC RX W HCPCS: Performed by: FAMILY MEDICINE

## 2018-01-01 PROCEDURE — 99238 HOSP IP/OBS DSCHRG MGMT 30/<: CPT | Performed by: FAMILY MEDICINE

## 2018-01-01 PROCEDURE — 6370000000 HC RX 637 (ALT 250 FOR IP): Performed by: FAMILY MEDICINE

## 2018-01-01 RX ADMIN — BACITRACIN: 500 OINTMENT TOPICAL at 09:09

## 2018-01-01 RX ADMIN — ENOXAPARIN SODIUM 40 MG: 40 INJECTION SUBCUTANEOUS at 09:11

## 2018-01-01 RX ADMIN — AMLODIPINE BESYLATE 5 MG: 5 TABLET ORAL at 09:11

## 2018-01-01 RX ADMIN — ACETAMINOPHEN 650 MG: 325 TABLET ORAL at 04:57

## 2018-01-01 RX ADMIN — LEFLUNOMIDE 20 MG: 10 TABLET, FILM COATED ORAL at 09:11

## 2018-01-01 RX ADMIN — PREDNISONE 5 MG: 5 TABLET ORAL at 09:11

## 2018-01-01 RX ADMIN — FOLIC ACID 1 MG: 1 TABLET ORAL at 09:11

## 2018-01-01 ASSESSMENT — PAIN DESCRIPTION - LOCATION: LOCATION: BUTTOCKS

## 2018-01-01 ASSESSMENT — PAIN DESCRIPTION - DESCRIPTORS: DESCRIPTORS: BURNING

## 2018-01-01 ASSESSMENT — PAIN DESCRIPTION - ORIENTATION: ORIENTATION: RIGHT;LEFT

## 2018-01-01 ASSESSMENT — PAIN SCALES - GENERAL
PAINLEVEL_OUTOF10: 3
PAINLEVEL_OUTOF10: 3

## 2018-01-01 ASSESSMENT — PAIN DESCRIPTION - PAIN TYPE: TYPE: ACUTE PAIN

## 2018-01-01 ASSESSMENT — PAIN SCALES - WONG BAKER: WONGBAKER_NUMERICALRESPONSE: 2

## 2018-01-01 NOTE — PROGRESS NOTES
Pt discharged to per in condition with belongings  Discharge instructions and scripts given  Discharge checklist reviewed and completed with pt and family.   Pt's med bin emptied  Pt denies having any further questions at this time

## 2018-01-01 NOTE — PLAN OF CARE
Problem: Falls - Risk of  Goal: Absence of falls  Outcome: Ongoing  Siderails up x 2  Hourly rounding. Call light in reach. Instructed to call for assist before attempting out of bed. Remains free from falls and accidental injury at this time. Floor free from obstacles, and bed is locked and in lowest position. Adequate lighting provided. Problem: Pain:  Goal: Pain level will decrease  Pain level will decrease   Outcome: Ongoing  Pain level assessment complete. Pt educated on pain scale and control interventions. PRN pain medication given per pt request.   Pt instructed to call out with new onset of pain or unrelieved pain.      Problem: Skin Integrity:  Goal: Will show no infection signs and symptoms  Will show no infection signs and symptoms   Outcome: Ongoing

## 2018-01-01 NOTE — PROGRESS NOTES
Instructed patient friend how to change dressing for tonight; patient tolerated dressing change well

## 2018-01-01 NOTE — PLAN OF CARE
Problem: Falls - Risk of  Goal: Absence of falls  Outcome: Met This Shift      Problem: Pain:  Goal: Pain level will decrease  Pain level will decrease   Outcome: Ongoing      Problem: Skin Integrity:  Goal: Will show no infection signs and symptoms  Will show no infection signs and symptoms   Outcome: Met This Shift

## 2018-01-02 ENCOUNTER — CARE COORDINATION (OUTPATIENT)
Dept: CASE MANAGEMENT | Age: 63
End: 2018-01-02

## 2018-01-03 ENCOUNTER — TELEPHONE (OUTPATIENT)
Dept: INTERNAL MEDICINE CLINIC | Age: 63
End: 2018-01-03

## 2018-01-04 LAB
CULTURE: NORMAL
Lab: NORMAL
Lab: NORMAL
SPECIMEN DESCRIPTION: NORMAL
STATUS: NORMAL
STATUS: NORMAL

## 2018-01-04 NOTE — CARE COORDINATION
[facility] Transitions Initial Follow Up Call    Call within 2 business days of discharge: Yes    Patient: Patricio Hector Patient : 1955   MRN: <[id number]>  Reason for Admission: There are no discharge diagnoses documented for the most recent discharge. Discharge Date: 18 RARS: [facility] Risk Score: 24.75     Spoke with:  [facility]: [address]    Non-face-to-face services provided:  Obtained and reviewed discharge summary and/or continuity of care documents  Assessment and support for treatment adherence and medication management-patient stated she has no questios about any of her medications or discharge instructioins, nurse from [facility] is coming today//JU    Care Transitions 24 Hour Call    Schedule Follow Up Appointment with PCP:  Declined  Do you have any ongoing symptoms?:  No  Do you have a copy of your discharge instructions?:  Yes  Do you have all of your prescriptions and are they filled?:  Yes  Have you been contacted by a [facility] [address]?:  No  Have you scheduled your follow up appointment?:  No  Were you discharged with any Home Care or Post Acute Services:  Yes  Post Acute Services:  [address] (Comment: martina)  Do you feel like you have everything you need to keep you well at home?:  Yes  Care Transitions Interventions     Other Services:  Completed (Comment: [facility])        Patient stated she was doing fine, has all her medications, no needs or concerns, nurse from [facility] is coming for a visit today, already has a follow up appointment on 18, agreeable to care transitions//JU  Follow Up  Future Appointments  Date Time Provider Nicholas Johnson   2018 9:15 AM Iban La MD Adult pc Lawana Rinne, RN

## 2018-01-08 ENCOUNTER — OFFICE VISIT (OUTPATIENT)
Dept: INTERNAL MEDICINE CLINIC | Age: 63
End: 2018-01-08
Payer: MEDICARE

## 2018-01-08 VITALS
SYSTOLIC BLOOD PRESSURE: 128 MMHG | HEIGHT: 63 IN | DIASTOLIC BLOOD PRESSURE: 70 MMHG | BODY MASS INDEX: 41.78 KG/M2 | WEIGHT: 235.8 LBS | HEART RATE: 91 BPM | OXYGEN SATURATION: 93 % | RESPIRATION RATE: 19 BRPM

## 2018-01-08 DIAGNOSIS — G47.33 OSA ON CPAP: ICD-10-CM

## 2018-01-08 DIAGNOSIS — I10 ESSENTIAL HYPERTENSION: ICD-10-CM

## 2018-01-08 DIAGNOSIS — D50.0 IRON DEFICIENCY ANEMIA DUE TO CHRONIC BLOOD LOSS: ICD-10-CM

## 2018-01-08 DIAGNOSIS — E66.01 MORBID OBESITY WITH BMI OF 40.0-44.9, ADULT (HCC): ICD-10-CM

## 2018-01-08 DIAGNOSIS — L89.152 SACRAL DECUBITUS ULCER, STAGE II (HCC): ICD-10-CM

## 2018-01-08 DIAGNOSIS — L98.499 CHRONIC SKIN ULCER, UNSPECIFIED ULCER STAGE (HCC): ICD-10-CM

## 2018-01-08 DIAGNOSIS — K21.9 GASTROESOPHAGEAL REFLUX DISEASE WITHOUT ESOPHAGITIS: ICD-10-CM

## 2018-01-08 DIAGNOSIS — Z79.52 ON PREDNISONE THERAPY: ICD-10-CM

## 2018-01-08 DIAGNOSIS — M06.9 RHEUMATOID ARTHRITIS INVOLVING MULTIPLE SITES, UNSPECIFIED RHEUMATOID FACTOR PRESENCE: ICD-10-CM

## 2018-01-08 DIAGNOSIS — J44.9 CHRONIC OBSTRUCTIVE PULMONARY DISEASE, UNSPECIFIED COPD TYPE (HCC): Primary | ICD-10-CM

## 2018-01-08 DIAGNOSIS — Z99.89 OSA ON CPAP: ICD-10-CM

## 2018-01-08 PROBLEM — R23.8 DENUDED SKIN: Status: RESOLVED | Noted: 2017-12-29 | Resolved: 2018-01-08

## 2018-01-08 PROBLEM — L03.317 CELLULITIS AND ABSCESS OF BUTTOCK: Status: RESOLVED | Noted: 2017-12-30 | Resolved: 2018-01-08

## 2018-01-08 PROBLEM — R19.7 DIARRHEA: Status: RESOLVED | Noted: 2017-09-28 | Resolved: 2018-01-08

## 2018-01-08 PROBLEM — D72.829 LEUKOCYTOSIS: Status: RESOLVED | Noted: 2017-12-30 | Resolved: 2018-01-08

## 2018-01-08 PROBLEM — L02.31 CELLULITIS AND ABSCESS OF BUTTOCK: Status: RESOLVED | Noted: 2017-12-30 | Resolved: 2018-01-08

## 2018-01-08 PROCEDURE — 3014F SCREEN MAMMO DOC REV: CPT | Performed by: FAMILY MEDICINE

## 2018-01-08 PROCEDURE — 1036F TOBACCO NON-USER: CPT | Performed by: FAMILY MEDICINE

## 2018-01-08 PROCEDURE — 3023F SPIROM DOC REV: CPT | Performed by: FAMILY MEDICINE

## 2018-01-08 PROCEDURE — G8926 SPIRO NO PERF OR DOC: HCPCS | Performed by: FAMILY MEDICINE

## 2018-01-08 PROCEDURE — 1111F DSCHRG MED/CURRENT MED MERGE: CPT | Performed by: FAMILY MEDICINE

## 2018-01-08 PROCEDURE — 99214 OFFICE O/P EST MOD 30 MIN: CPT | Performed by: FAMILY MEDICINE

## 2018-01-08 PROCEDURE — 3017F COLORECTAL CA SCREEN DOC REV: CPT | Performed by: FAMILY MEDICINE

## 2018-01-08 PROCEDURE — G8417 CALC BMI ABV UP PARAM F/U: HCPCS | Performed by: FAMILY MEDICINE

## 2018-01-08 PROCEDURE — G8484 FLU IMMUNIZE NO ADMIN: HCPCS | Performed by: FAMILY MEDICINE

## 2018-01-08 PROCEDURE — G8427 DOCREV CUR MEDS BY ELIG CLIN: HCPCS | Performed by: FAMILY MEDICINE

## 2018-01-08 RX ORDER — PREDNISONE 1 MG/1
TABLET ORAL
COMMUNITY
Start: 2017-12-24 | End: 2018-01-08 | Stop reason: SDUPTHER

## 2018-01-08 ASSESSMENT — ENCOUNTER SYMPTOMS
DIFFICULTY BREATHING: 1
ALLERGIC/IMMUNOLOGIC NEGATIVE: 1
GASTROINTESTINAL NEGATIVE: 1
EYES NEGATIVE: 1
RESPIRATORY NEGATIVE: 1

## 2018-01-08 ASSESSMENT — COPD QUESTIONNAIRES: COPD: 1

## 2018-01-08 NOTE — PROGRESS NOTES
condition    Health Maintenance   Topic Date Due    Zostavax vaccine  02/23/2018 (Originally 6/12/2014)    Cervical cancer screen  05/22/2018 (Originally 6/12/1976)    Breast cancer screen  06/20/2019    Diabetes screen  09/07/2020    Colon cancer screen colonoscopy  03/30/2021    Lipid screen  09/07/2022    DTaP/Tdap/Td vaccine (2 - Td) 12/29/2027    Flu vaccine  Completed    Pneumococcal med risk  Completed    Hepatitis C screen  Completed    HIV screen  Completed

## 2018-01-08 NOTE — PROGRESS NOTES
Subjective:      Patient ID: Unknown Tanmay is a 60 y.o. female. COPD   She complains of difficulty breathing. This is a chronic problem. The current episode started more than 1 month ago. The problem occurs intermittently. The problem has been waxing and waning. Associated symptoms include malaise/fatigue and myalgias. Her symptoms are aggravated by change in weather, any activity and strenuous activity. Her symptoms are alleviated by beta-agonist and steroid inhaler. She reports moderate improvement on treatment. Her past medical history is significant for COPD. Review of Systems   Constitutional: Positive for malaise/fatigue. HENT: Negative. Eyes: Negative. Respiratory: Negative. Cardiovascular: Negative. Gastrointestinal: Negative. Endocrine: Negative. Musculoskeletal: Positive for arthralgias, gait problem and myalgias. Skin: Negative. Allergic/Immunologic: Negative. Hematological: Negative. Psychiatric/Behavioral: Negative. Past family and social history unremarkable. Objective:   Physical Exam   Constitutional: She is oriented to person, place, and time. She appears well-developed and well-nourished. Morbid obesity with sleep apnea on CPAP   HENT:   Head: Normocephalic and atraumatic. Right Ear: External ear normal.   Left Ear: External ear normal.   Mouth/Throat: Oropharynx is clear and moist.   Eyes: Conjunctivae and EOM are normal. Pupils are equal, round, and reactive to light. Neck: Normal range of motion. Neck supple. Cardiovascular: Normal rate, regular rhythm, normal heart sounds and intact distal pulses. Pulmonary/Chest: Effort normal and breath sounds normal.   Abdominal: Soft. Bowel sounds are normal.   Genitourinary: Vagina normal and uterus normal.   Musculoskeletal: Normal range of motion. Neurological: She is alert and oriented to person, place, and time. She has normal reflexes. Rheumatologic polyarthralgia.   Regular follow-up with rheumatology. Skin: Skin is warm and dry. Grade 2 decubitus ulcer on the sacrum. Psychiatric: She has a normal mood and affect. Her behavior is normal. Judgment and thought content normal.   Vitals reviewed. Assessment:      1. Chronic obstructive pulmonary disease, unspecified COPD type ([address]     2. Sacral decubitus ulcer, stage II     3. Essential hypertension     4. Rheumatoid arthritis involving multiple sites, unspecified rheumatoid factor presence ([address]     5. Morbid obesity with BMI of 40.0-44.9, adult ([address]     6. Gastroesophageal reflux disease without esophagitis     7. Iron deficiency anemia due to chronic blood loss     8. NICOLASA on CPAP     9. On prednisone therapy     10. Chronic skin ulcer, unspecified ulcer stage ([address]             Plan:      58-year-old overweight -American female with multiple underlying health Issues is presented for follow-up. She does not voice any distress. She is afebrile hemodynamically stable,  History of rheumatoid arthritis. She has regular follow-up with rheumatology. She is on chronic prednisone, methotrexate and Arava. Fall precautions advised. She would benefit from using her walker especially in winter  COPD. She is a nonsmoker. No recent decompensation. She is on beta agonist, inhaled corticosteroids and chronic oral steroids secondary to rheumatoid arthritis  Morbid obesity with sleep apnea she is compliant with CPAP  2 decubitus ulcer on the sacrum. She has home care nurse ×2 per day dressing change. Progressive granulation  GERD stable on proton pump inhibitor. Hemodynamically stable  Patient is advised to see an ophthalmologist due to chronic steroid use  Med list is advised to continue  Anemia of chronic disease with underlying rheumatoid arthritis. Hemoglobin is 12.1.   Depression screen is negative  No history of tobacco, excessive alcohol or illicit drug use  Med list reviewed advised to continue  Observe and call for any

## 2018-01-20 ENCOUNTER — CARE COORDINATION (OUTPATIENT)
Dept: CASE MANAGEMENT | Age: 63
End: 2018-01-20

## 2018-01-20 NOTE — CARE COORDINATION
[facility] Transitions Follow Up Call    2018    Patient: Shahid Robb  Patient : 1955   MRN: [id number]  Reason for Admission: There are no discharge diagnoses documented for the most recent discharge. Discharge Date: 18 RARS: Risk Score: 24.75       Spoke with: Felisha Hector    Patient reports she is doing well. Denies recent changes/new questions related to medications. Indicated home health ended on 2018. Denies new needs/concerns. Care transitions resolved at this time. Care Transitions Subsequent and Final Call    Subsequent and Final Calls  Do you have any ongoing symptoms?:  No  Have your medications changed?:  No  Do you have any questions related to your medications?:  No  Do you currently have any active services?:  Yes  Are you currently active with any services?:  Home Health  Do you have any needs or concerns that I can assist you with?:  No  Care Transitions Interventions     Other Services:  Completed ([facility]   Other Interventions:             Follow Up  Future Appointments  Date Time Provider Department Center   4/10/2018 9:30 AM Avel Duke MD Adult pc Gareth Logan MA

## 2018-01-20 NOTE — CARE COORDINATION
Attempt to reach patient for care transitions. [facility] requesting return call. Contact information provided.

## 2018-02-26 RX ORDER — MONTELUKAST SODIUM 10 MG/1
TABLET ORAL
Qty: 180 TABLET | Refills: 0 | Status: SHIPPED | OUTPATIENT
Start: 2018-02-26 | End: 2018-07-31 | Stop reason: SDUPTHER

## 2018-03-21 ENCOUNTER — HOSPITAL ENCOUNTER (OUTPATIENT)
Dept: GENERAL RADIOLOGY | Age: 63
Discharge: HOME | End: 2018-03-23
Payer: MEDICARE

## 2018-03-21 ENCOUNTER — HOSPITAL ENCOUNTER (OUTPATIENT)
Age: 63
Discharge: HOME | End: 2018-03-23
Payer: MEDICARE

## 2018-03-21 ENCOUNTER — OFFICE VISIT (OUTPATIENT)
Dept: INTERNAL MEDICINE CLINIC | Age: 63
End: 2018-03-21
Payer: MEDICARE

## 2018-03-21 VITALS
RESPIRATION RATE: 17 BRPM | BODY MASS INDEX: 40.93 KG/M2 | DIASTOLIC BLOOD PRESSURE: 70 MMHG | WEIGHT: 231 LBS | HEART RATE: 71 BPM | OXYGEN SATURATION: 95 % | SYSTOLIC BLOOD PRESSURE: 130 MMHG | HEIGHT: 63 IN

## 2018-03-21 DIAGNOSIS — I01.1 RHEUMATOID AORTITIS: ICD-10-CM

## 2018-03-21 DIAGNOSIS — G47.33 OSA ON CPAP: ICD-10-CM

## 2018-03-21 DIAGNOSIS — J44.1 CHRONIC OBSTRUCTIVE PULMONARY DISEASE WITH ACUTE EXACERBATION (HCC): Primary | ICD-10-CM

## 2018-03-21 DIAGNOSIS — Z99.89 OSA ON CPAP: ICD-10-CM

## 2018-03-21 DIAGNOSIS — J44.1 CHRONIC OBSTRUCTIVE PULMONARY DISEASE WITH ACUTE EXACERBATION (HCC): ICD-10-CM

## 2018-03-21 DIAGNOSIS — Z79.631 LONG TERM METHOTREXATE USER: ICD-10-CM

## 2018-03-21 DIAGNOSIS — E88.09 HYPOALBUMINEMIA: ICD-10-CM

## 2018-03-21 DIAGNOSIS — Z79.52 ON PREDNISONE THERAPY: ICD-10-CM

## 2018-03-21 DIAGNOSIS — M06.89 OTHER SPECIFIED RHEUMATOID ARTHRITIS, MULTIPLE SITES (HCC): ICD-10-CM

## 2018-03-21 PROCEDURE — G8427 DOCREV CUR MEDS BY ELIG CLIN: HCPCS | Performed by: FAMILY MEDICINE

## 2018-03-21 PROCEDURE — 3023F SPIROM DOC REV: CPT | Performed by: FAMILY MEDICINE

## 2018-03-21 PROCEDURE — G8926 SPIRO NO PERF OR DOC: HCPCS | Performed by: FAMILY MEDICINE

## 2018-03-21 PROCEDURE — 99214 OFFICE O/P EST MOD 30 MIN: CPT | Performed by: FAMILY MEDICINE

## 2018-03-21 PROCEDURE — 3014F SCREEN MAMMO DOC REV: CPT | Performed by: FAMILY MEDICINE

## 2018-03-21 PROCEDURE — 3017F COLORECTAL CA SCREEN DOC REV: CPT | Performed by: FAMILY MEDICINE

## 2018-03-21 PROCEDURE — G8482 FLU IMMUNIZE ORDER/ADMIN: HCPCS | Performed by: FAMILY MEDICINE

## 2018-03-21 PROCEDURE — G8417 CALC BMI ABV UP PARAM F/U: HCPCS | Performed by: FAMILY MEDICINE

## 2018-03-21 PROCEDURE — 96372 THER/PROPH/DIAG INJ SC/IM: CPT | Performed by: FAMILY MEDICINE

## 2018-03-21 PROCEDURE — 1036F TOBACCO NON-USER: CPT | Performed by: FAMILY MEDICINE

## 2018-03-21 PROCEDURE — 71046 X-RAY EXAM CHEST 2 VIEWS: CPT

## 2018-03-21 RX ORDER — AZITHROMYCIN 250 MG/1
TABLET, FILM COATED ORAL
Qty: 1 PACKET | Refills: 0 | Status: SHIPPED | OUTPATIENT
Start: 2018-03-21 | End: 2018-06-25 | Stop reason: ALTCHOICE

## 2018-03-21 RX ORDER — CEPHALEXIN 500 MG/1
500 CAPSULE ORAL 3 TIMES DAILY
Qty: 21 CAPSULE | Refills: 0 | Status: SHIPPED | OUTPATIENT
Start: 2018-03-21 | End: 2018-03-28

## 2018-03-21 RX ORDER — CEFTRIAXONE 1 G/1
1 INJECTION, POWDER, FOR SOLUTION INTRAMUSCULAR; INTRAVENOUS ONCE
Status: COMPLETED | OUTPATIENT
Start: 2018-03-21 | End: 2018-03-21

## 2018-03-21 RX ORDER — DEXAMETHASONE SODIUM PHOSPHATE 4 MG/ML
8 INJECTION, SOLUTION INTRA-ARTICULAR; INTRALESIONAL; INTRAMUSCULAR; INTRAVENOUS; SOFT TISSUE ONCE
Status: COMPLETED | OUTPATIENT
Start: 2018-03-21 | End: 2018-03-21

## 2018-03-21 RX ADMIN — DEXAMETHASONE SODIUM PHOSPHATE 8 MG: 4 INJECTION, SOLUTION INTRA-ARTICULAR; INTRALESIONAL; INTRAMUSCULAR; INTRAVENOUS; SOFT TISSUE at 12:01

## 2018-03-21 RX ADMIN — CEFTRIAXONE 1 G: 1 INJECTION, POWDER, FOR SOLUTION INTRAMUSCULAR; INTRAVENOUS at 12:00

## 2018-03-21 ASSESSMENT — ENCOUNTER SYMPTOMS
EYES NEGATIVE: 1
GASTROINTESTINAL NEGATIVE: 1
WHEEZING: 1
ALLERGIC/IMMUNOLOGIC NEGATIVE: 1
SORE THROAT: 1

## 2018-03-21 NOTE — PROGRESS NOTES
Chronic Disease Visit Information    BP Readings from Last 3 Encounters:   01/08/18 128/70   01/01/18 (!) 148/80   11/15/17 (!) 114/57          Hemoglobin A1C (%)   Date Value   09/07/2017 5.6   01/19/2016 5.4     LDL Cholesterol (mg/dL)   Date Value   09/07/2017 78     HDL (mg/dL)   Date Value   09/07/2017 102     BUN (mg/dL)   Date Value   12/31/2017 4 (L)     CREATININE (mg/dL)   Date Value   12/31/2017 0.67     Glucose (mg/dL)   Date Value   12/30/2017 108 (H)            Have you changed or started any medications since your last visit including any over-the-counter medicines, vitamins, or herbal medicines? no   Are you having any side effects from any of your medications? -  no  Have you stopped taking any of your medications? Is so, why? -  no    Have you seen any other physician or provider since your last visit? No  Have you had any other diagnostic tests since your last visit? No  Have you been seen in the emergency room and/or had an admission to a hospital since we last saw you? No  Have you had your annual diabetic retinal (eye) exam? Yes - Records Requested  Have you had your routine dental cleaning in the past 6 months? yes -     Have you activated your [employer] account? If not, what are your barriers?  Yes     Patient Care Team:  Christopher Kiser MD as PCP - General (Family Medicine)  Christopher Kiser MD as PCP - S Attributed Provider  Sho Kim MD as Consulting Physician (Rheumatology)  Mechelle Conner MD as Surgeon (Orthopedic Surgery)  Mariama Hoang MD as Consulting Physician (Gastroenterology)  Cori Florian MD as Consulting Physician (Cardiology)  Lori Buchanan MD as Consulting Physician (Pulmonary Disease)  Rita Gilford, MD as Consulting Physician (Gastroenterology)         Medical History Review  Past Medical, Family, and Social History reviewed and does not contribute to the patient presenting condition    Health Maintenance   Topic Date Due    Shingles Vaccine (1 of 2 - 2 Dose Series) 06/12/2004    Cervical cancer screen  05/22/2018 (Originally [birth year]12/1976)    Breast cancer screen  06/20/2019    Diabetes screen  09/07/2020    Colon cancer screen colonoscopy  03/30/2021    Lipid screen  09/07/2022    DTaP/Tdap/Td vaccine (2 - Td) 12/29/2027    Flu vaccine  Completed    Pneumococcal med risk  Completed    Hepatitis C screen  Completed    HIV screen  Completed

## 2018-03-22 ENCOUNTER — TELEPHONE (OUTPATIENT)
Dept: INTERNAL MEDICINE CLINIC | Age: 63
End: 2018-03-22

## 2018-03-22 NOTE — TELEPHONE ENCOUNTER
Health Maintenance   Topic Date Due    Cervical cancer screen  05/22/2018 (Originally [birth year]12/1975)    Shingles Vaccine (1 of 2 - 2 Dose Series) 04/02/2019 (Originally 6/12/2004)    Breast cancer screen  06/20/2019    Diabetes screen  09/07/2020    Colon cancer screen colonoscopy  03/30/2021    Lipid screen  09/07/2022    DTaP/Tdap/Td vaccine (2 - Td) 12/29/2027    Flu vaccine  Completed    Pneumococcal med risk  Completed    Hepatitis C screen  Completed    HIV screen  Completed             (applicable per patient's age: Cancer Screenings, Depression Screening, Fall Risk Screening, Immunizations)    Hemoglobin A1C (%)   Date Value   09/07/2017 5.6   01/19/2016 5.4     LDL Cholesterol (mg/dL)   Date Value   09/07/2017 78     AST (U/L)   Date Value   12/29/2017 25     ALT (U/L)   Date Value   12/29/2017 14     BUN (mg/dL)   Date Value   12/31/2017 4 (L)      (goal A1C is < 7)   (goal LDL is <100) need 30-50% reduction from baseline     BP Readings from Last 3 Encounters:   03/21/18 130/70   01/08/18 128/70   01/01/18 (!) 148/80    (goal /80)      All Future Testing planned in [facility]:  Lab Frequency Next Occurrence   EGD Once 12/29/2017       Next Visit Date:  Future Appointments  Date Time Provider Nicholas Johnson   6/11/2018 11:30 AM Christopher Kiser MD Adult pc [facility]            Patient Active Problem List:     Rheumatoid arthritis involving multiple sites [facility])     Osteoporosis     GERD (gastroesophageal reflux disease)     Hypertension     Iron deficiency anemia due to chronic blood loss     COPD exacerbation (HCC)     Rheumatoid aortitis     NICOLASA on CPAP     Hiatal hernia     Other specified rheumatoid arthritis, multiple sites ([facility] [address]     On prednisone therapy     Long term methotrexate user     Hypoalbuminemia

## 2018-04-09 ENCOUNTER — TELEPHONE (OUTPATIENT)
Dept: INTERNAL MEDICINE CLINIC | Age: 63
End: 2018-04-09

## 2018-04-23 RX ORDER — DEXLANSOPRAZOLE 60 MG/1
CAPSULE, DELAYED RELEASE ORAL
Qty: 30 CAPSULE | Refills: 3 | Status: SHIPPED | OUTPATIENT
Start: 2018-04-23 | End: 2018-07-31 | Stop reason: SDUPTHER

## 2018-06-25 ENCOUNTER — OFFICE VISIT (OUTPATIENT)
Dept: INTERNAL MEDICINE CLINIC | Age: 63
End: 2018-06-25
Payer: MEDICARE

## 2018-06-25 VITALS
WEIGHT: 231 LBS | RESPIRATION RATE: 17 BRPM | HEART RATE: 77 BPM | OXYGEN SATURATION: 95 % | BODY MASS INDEX: 40.93 KG/M2 | DIASTOLIC BLOOD PRESSURE: 80 MMHG | SYSTOLIC BLOOD PRESSURE: 130 MMHG | HEIGHT: 63 IN

## 2018-06-25 DIAGNOSIS — Z79.52 ON PREDNISONE THERAPY: ICD-10-CM

## 2018-06-25 DIAGNOSIS — J44.9 CHRONIC OBSTRUCTIVE PULMONARY DISEASE, UNSPECIFIED COPD TYPE (HCC): Primary | ICD-10-CM

## 2018-06-25 DIAGNOSIS — M81.8 OTHER OSTEOPOROSIS WITHOUT CURRENT PATHOLOGICAL FRACTURE: ICD-10-CM

## 2018-06-25 DIAGNOSIS — G47.33 OSA ON CPAP: ICD-10-CM

## 2018-06-25 DIAGNOSIS — I10 ESSENTIAL HYPERTENSION: ICD-10-CM

## 2018-06-25 DIAGNOSIS — G89.4 CHRONIC PAIN SYNDROME: ICD-10-CM

## 2018-06-25 DIAGNOSIS — I01.1 RHEUMATOID AORTITIS: ICD-10-CM

## 2018-06-25 DIAGNOSIS — K44.9 HIATAL HERNIA: ICD-10-CM

## 2018-06-25 DIAGNOSIS — D50.0 IRON DEFICIENCY ANEMIA DUE TO CHRONIC BLOOD LOSS: ICD-10-CM

## 2018-06-25 DIAGNOSIS — Z99.89 OSA ON CPAP: ICD-10-CM

## 2018-06-25 DIAGNOSIS — K21.9 GASTROESOPHAGEAL REFLUX DISEASE WITHOUT ESOPHAGITIS: ICD-10-CM

## 2018-06-25 DIAGNOSIS — M05.79 RHEUMATOID ARTHRITIS INVOLVING MULTIPLE SITES WITH POSITIVE RHEUMATOID FACTOR (HCC): ICD-10-CM

## 2018-06-25 PROBLEM — M25.579 SINUS TARSI SYNDROME: Status: ACTIVE | Noted: 2018-06-25

## 2018-06-25 PROBLEM — S83.90XA SPRAIN OF KNEE AND LEG: Status: ACTIVE | Noted: 2018-06-25

## 2018-06-25 PROBLEM — M17.9 OSTEOARTHRITIS OF KNEE: Status: ACTIVE | Noted: 2018-06-25

## 2018-06-25 PROBLEM — M75.40 IMPINGEMENT SYNDROME OF SHOULDER REGION: Status: ACTIVE | Noted: 2018-06-25

## 2018-06-25 PROBLEM — M19.91 LOCALIZED, PRIMARY OSTEOARTHRITIS: Status: ACTIVE | Noted: 2018-06-25

## 2018-06-25 PROBLEM — M19.079 OSTEOARTHRITIS OF FOOT JOINT: Status: ACTIVE | Noted: 2018-06-25

## 2018-06-25 PROBLEM — M19.039 ARTHRITIS OF WRIST: Status: ACTIVE | Noted: 2018-06-25

## 2018-06-25 PROBLEM — M72.2 PLANTAR FASCIITIS: Status: ACTIVE | Noted: 2018-06-25

## 2018-06-25 PROBLEM — M41.9 ACQUIRED SCOLIOSIS: Status: ACTIVE | Noted: 2018-06-25

## 2018-06-25 PROBLEM — S76.119A RUPTURE OF QUADRICEPS TENDON: Status: ACTIVE | Noted: 2018-06-25

## 2018-06-25 PROBLEM — S76.119A TRAUMATIC RUPTURE OF QUADRICEPS TENDON: Status: ACTIVE | Noted: 2018-06-25

## 2018-06-25 PROBLEM — M85.80 OSTEOPENIA: Status: ACTIVE | Noted: 2018-06-25

## 2018-06-25 PROBLEM — M65.9 SYNOVITIS: Status: ACTIVE | Noted: 2018-06-25

## 2018-06-25 PROCEDURE — 3023F SPIROM DOC REV: CPT | Performed by: FAMILY MEDICINE

## 2018-06-25 PROCEDURE — G8427 DOCREV CUR MEDS BY ELIG CLIN: HCPCS | Performed by: FAMILY MEDICINE

## 2018-06-25 PROCEDURE — 3017F COLORECTAL CA SCREEN DOC REV: CPT | Performed by: FAMILY MEDICINE

## 2018-06-25 PROCEDURE — 1036F TOBACCO NON-USER: CPT | Performed by: FAMILY MEDICINE

## 2018-06-25 PROCEDURE — 99214 OFFICE O/P EST MOD 30 MIN: CPT | Performed by: FAMILY MEDICINE

## 2018-06-25 PROCEDURE — G8926 SPIRO NO PERF OR DOC: HCPCS | Performed by: FAMILY MEDICINE

## 2018-06-25 PROCEDURE — G8417 CALC BMI ABV UP PARAM F/U: HCPCS | Performed by: FAMILY MEDICINE

## 2018-06-25 ASSESSMENT — PATIENT HEALTH QUESTIONNAIRE - PHQ9
1. LITTLE INTEREST OR PLEASURE IN DOING THINGS: 0
2. FEELING DOWN, DEPRESSED OR HOPELESS: 0
SUM OF ALL RESPONSES TO PHQ QUESTIONS 1-9: 0
SUM OF ALL RESPONSES TO PHQ9 QUESTIONS 1 & 2: 0

## 2018-06-25 ASSESSMENT — ENCOUNTER SYMPTOMS
EYES NEGATIVE: 1
BACK PAIN: 1
GASTROINTESTINAL NEGATIVE: 1
SHORTNESS OF BREATH: 1
ALLERGIC/IMMUNOLOGIC NEGATIVE: 1

## 2018-06-25 ASSESSMENT — COPD QUESTIONNAIRES: COPD: 1

## 2018-07-09 ENCOUNTER — HOSPITAL ENCOUNTER (OUTPATIENT)
Age: 63
Setting detail: SPECIMEN
Discharge: HOME | End: 2018-07-09
Payer: MEDICARE

## 2018-07-09 DIAGNOSIS — M05.79 RHEUMATOID ARTHRITIS INVOLVING MULTIPLE SITES WITH POSITIVE RHEUMATOID FACTOR (HCC): ICD-10-CM

## 2018-07-09 DIAGNOSIS — J44.9 CHRONIC OBSTRUCTIVE PULMONARY DISEASE, UNSPECIFIED COPD TYPE (HCC): ICD-10-CM

## 2018-07-09 DIAGNOSIS — I10 ESSENTIAL HYPERTENSION: ICD-10-CM

## 2018-07-09 LAB
ALBUMIN SERPL-MCNC: 3.9 G/DL (ref 3.5–5.2)
ALBUMIN/GLOBULIN RATIO: 1.3 (ref 1–2.5)
ALP BLD-CCNC: 61 U/L (ref 35–104)
ALT SERPL-CCNC: 21 U/L (ref 5–33)
ANION GAP SERPL CALCULATED.3IONS-SCNC: 14 MMOL/L (ref 9–17)
AST SERPL-CCNC: 16 U/L
BILIRUB SERPL-MCNC: 0.3 MG/DL (ref 0.3–1.2)
BUN BLDV-MCNC: 11 MG/DL (ref 8–23)
BUN/CREAT BLD: ABNORMAL (ref 9–20)
CALCIUM SERPL-MCNC: 9.8 MG/DL (ref 8.6–10.4)
CHLORIDE BLD-SCNC: 108 MMOL/L (ref 98–107)
CHOLESTEROL/HDL RATIO: 1.8
CHOLESTEROL: 242 MG/DL
CO2: 21 MMOL/L (ref 20–31)
CREAT SERPL-MCNC: 0.67 MG/DL (ref 0.5–0.9)
ESTIMATED AVERAGE GLUCOSE: 111 MG/DL
GFR AFRICAN AMERICAN: >60 ML/MIN
GFR NON-AFRICAN AMERICAN: >60 ML/MIN
GFR SERPL CREATININE-BSD FRML MDRD: ABNORMAL ML/MIN/{1.73_M2}
GFR SERPL CREATININE-BSD FRML MDRD: ABNORMAL ML/MIN/{1.73_M2}
GLUCOSE BLD-MCNC: 92 MG/DL (ref 70–99)
HAV IGM SER IA-ACNC: NONREACTIVE
HBA1C MFR BLD: 5.5 % (ref 4–6)
HCT VFR BLD CALC: 40.7 % (ref 36.3–47.1)
HDLC SERPL-MCNC: 138 MG/DL
HEMOGLOBIN: 12.4 G/DL (ref 11.9–15.1)
HEPATITIS B CORE IGM ANTIBODY: NONREACTIVE
HEPATITIS B SURFACE ANTIGEN: NONREACTIVE
HEPATITIS C ANTIBODY: NONREACTIVE
HIV AG/AB: NONREACTIVE
LDL CHOLESTEROL: 91 MG/DL (ref 0–130)
MCH RBC QN AUTO: 29 PG (ref 25.2–33.5)
MCHC RBC AUTO-ENTMCNC: 30.5 G/DL (ref 28.4–34.8)
MCV RBC AUTO: 95.1 FL (ref 82.6–102.9)
NRBC AUTOMATED: 0 PER 100 WBC
PDW BLD-RTO: 15.6 % (ref 11.8–14.4)
PLATELET # BLD: 333 K/UL (ref 138–453)
PMV BLD AUTO: 12.2 FL (ref 8.1–13.5)
POTASSIUM SERPL-SCNC: 4.3 MMOL/L (ref 3.7–5.3)
RBC # BLD: 4.28 M/UL (ref 3.95–5.11)
SODIUM BLD-SCNC: 143 MMOL/L (ref 135–144)
TOTAL PROTEIN: 6.9 G/DL (ref 6.4–8.3)
TRIGL SERPL-MCNC: 63 MG/DL
TSH SERPL DL<=0.05 MIU/L-ACNC: 1.99 MIU/L (ref 0.3–5)
VLDLC SERPL CALC-MCNC: ABNORMAL MG/DL (ref 1–30)
WBC # BLD: 14 K/UL (ref 3.5–11.3)

## 2018-07-10 LAB — VDRL, QUANTITATIVE: NEGATIVE

## 2018-07-31 ENCOUNTER — HOSPITAL ENCOUNTER (OUTPATIENT)
Dept: NON INVASIVE DIAGNOSTICS | Age: 63
Discharge: HOME | End: 2018-07-31
Payer: MEDICARE

## 2018-07-31 DIAGNOSIS — I10 ESSENTIAL HYPERTENSION: ICD-10-CM

## 2018-07-31 DIAGNOSIS — M05.79 RHEUMATOID ARTHRITIS INVOLVING MULTIPLE SITES WITH POSITIVE RHEUMATOID FACTOR (HCC): ICD-10-CM

## 2018-07-31 DIAGNOSIS — I01.1 RHEUMATOID AORTITIS: ICD-10-CM

## 2018-07-31 LAB
LV EF: 65 %
LVEF MODALITY: NORMAL

## 2018-07-31 PROCEDURE — 93306 TTE W/DOPPLER COMPLETE: CPT

## 2018-07-31 NOTE — TELEPHONE ENCOUNTER
lov 6/25/18     Next Visit Date:  Future Appointments  Date Time Provider Department Center   9/24/2018 10:45 AM Ramy Palm MD Adult pc [address] Maintenance   Topic Date Due    Shingles Vaccine (1 of 2 - 2 Dose Series) 04/02/2019 (Originally 6/12/2004)    Cervical cancer screen  07/16/2019 (Originally [birth year]12/1975)    Flu vaccine (1) 09/01/2018    Breast cancer screen  06/20/2019    Colon cancer screen colonoscopy  03/30/2021    Lipid screen  07/09/2023    DTaP/Tdap/Td vaccine (2 - Td) 12/29/2027    Pneumococcal med risk  Completed    Hepatitis C screen  Completed    HIV screen  Completed       Hemoglobin A1C (%)   Date Value   07/09/2018 5.5   09/07/2017 5.6   01/19/2016 5.4             ( goal A1C is < 7)   No results found for: LABMICR  LDL Cholesterol (mg/dL)   Date Value   07/09/2018 91   09/07/2017 78       (goal LDL is <100)   AST (U/L)   Date Value   07/09/2018 16     ALT (U/L)   Date Value   07/09/2018 21     BUN (mg/dL)   Date Value   07/09/2018 11     BP Readings from Last 3 Encounters:   06/25/18 130/80   03/21/18 130/70   01/08/18 128/70          (goal 120/80)    All Future Testing planned in [facility]  Lab Frequency Next Occurrence   EGD Once 12/29/2017               Patient Active Problem List:     Rheumatoid arthritis involving multiple sites ([facility] [address]     Osteoporosis     GERD (gastroesophageal reflux disease)     Hypertension     Iron deficiency anemia due to chronic blood loss     COPD exacerbation (HCC)     Rheumatoid aortitis     NICOLASA on CPAP     Hiatal hernia     Other specified rheumatoid arthritis, multiple sites ([address]     On prednisone therapy     Long term methotrexate user     Hypoalbuminemia     Acquired scoliosis     Arthritis of wrist     Impingement syndrome of shoulder region     Localized, primary osteoarthritis     Osteoarthritis of foot joint     Osteoarthritis of knee     Osteopenia     Plantar fasciitis     Rupture of quadriceps tendon     Sinus tarsi syndrome Sprain of knee and leg     Synovitis     Traumatic rupture of quadriceps tendon

## 2018-08-01 RX ORDER — MONTELUKAST SODIUM 10 MG/1
TABLET ORAL
Qty: 180 TABLET | Refills: 0 | Status: SHIPPED | OUTPATIENT
Start: 2018-08-01 | End: 2019-04-20 | Stop reason: SDUPTHER

## 2018-08-01 RX ORDER — DEXLANSOPRAZOLE 60 MG/1
CAPSULE, DELAYED RELEASE ORAL
Qty: 30 CAPSULE | Refills: 3 | Status: SHIPPED | OUTPATIENT
Start: 2018-08-01 | End: 2018-12-22 | Stop reason: SDUPTHER

## 2018-08-29 RX ORDER — DEXLANSOPRAZOLE 60 MG/1
60 CAPSULE, DELAYED RELEASE ORAL DAILY
Qty: 30 CAPSULE | Refills: 2 | OUTPATIENT
Start: 2018-08-29

## 2018-09-24 ENCOUNTER — OFFICE VISIT (OUTPATIENT)
Dept: INTERNAL MEDICINE CLINIC | Age: 63
End: 2018-09-24
Payer: MEDICARE

## 2018-09-24 VITALS
DIASTOLIC BLOOD PRESSURE: 80 MMHG | RESPIRATION RATE: 20 BRPM | WEIGHT: 236.4 LBS | BODY MASS INDEX: 41.89 KG/M2 | SYSTOLIC BLOOD PRESSURE: 120 MMHG | OXYGEN SATURATION: 98 % | HEART RATE: 82 BPM | HEIGHT: 63 IN

## 2018-09-24 DIAGNOSIS — M41.9 ACQUIRED SCOLIOSIS: ICD-10-CM

## 2018-09-24 DIAGNOSIS — Z79.631 LONG TERM METHOTREXATE USER: ICD-10-CM

## 2018-09-24 DIAGNOSIS — M85.80 OSTEOPENIA, UNSPECIFIED LOCATION: ICD-10-CM

## 2018-09-24 DIAGNOSIS — M06.89 OTHER SPECIFIED RHEUMATOID ARTHRITIS, MULTIPLE SITES (HCC): ICD-10-CM

## 2018-09-24 DIAGNOSIS — E88.09 HYPOALBUMINEMIA: ICD-10-CM

## 2018-09-24 DIAGNOSIS — G47.33 OSA ON CPAP: ICD-10-CM

## 2018-09-24 DIAGNOSIS — Z99.89 OSA ON CPAP: ICD-10-CM

## 2018-09-24 DIAGNOSIS — K21.9 GASTROESOPHAGEAL REFLUX DISEASE WITHOUT ESOPHAGITIS: ICD-10-CM

## 2018-09-24 DIAGNOSIS — M75.40 IMPINGEMENT SYNDROME OF SHOULDER REGION, UNSPECIFIED LATERALITY: ICD-10-CM

## 2018-09-24 DIAGNOSIS — D50.0 IRON DEFICIENCY ANEMIA DUE TO CHRONIC BLOOD LOSS: ICD-10-CM

## 2018-09-24 DIAGNOSIS — M70.62 TROCHANTERIC BURSITIS OF LEFT HIP: Primary | ICD-10-CM

## 2018-09-24 DIAGNOSIS — J44.9 CHRONIC OBSTRUCTIVE PULMONARY DISEASE, UNSPECIFIED COPD TYPE (HCC): ICD-10-CM

## 2018-09-24 DIAGNOSIS — K44.9 HIATAL HERNIA: ICD-10-CM

## 2018-09-24 DIAGNOSIS — Z79.52 ON PREDNISONE THERAPY: ICD-10-CM

## 2018-09-24 DIAGNOSIS — M76.32 IT BAND SYNDROME, LEFT: ICD-10-CM

## 2018-09-24 PROCEDURE — G8427 DOCREV CUR MEDS BY ELIG CLIN: HCPCS | Performed by: FAMILY MEDICINE

## 2018-09-24 PROCEDURE — 1036F TOBACCO NON-USER: CPT | Performed by: FAMILY MEDICINE

## 2018-09-24 PROCEDURE — 3023F SPIROM DOC REV: CPT | Performed by: FAMILY MEDICINE

## 2018-09-24 PROCEDURE — G8926 SPIRO NO PERF OR DOC: HCPCS | Performed by: FAMILY MEDICINE

## 2018-09-24 PROCEDURE — G8417 CALC BMI ABV UP PARAM F/U: HCPCS | Performed by: FAMILY MEDICINE

## 2018-09-24 PROCEDURE — 3017F COLORECTAL CA SCREEN DOC REV: CPT | Performed by: FAMILY MEDICINE

## 2018-09-24 PROCEDURE — 99214 OFFICE O/P EST MOD 30 MIN: CPT | Performed by: FAMILY MEDICINE

## 2018-09-24 NOTE — PROGRESS NOTES
Cervical cancer screen  07/16/2019 (Originally [birth year]12/1975)    Breast cancer screen  06/20/2019    Colon cancer screen colonoscopy  03/30/2021    Lipid screen  07/09/2023    DTaP/Tdap/Td vaccine (2 - Td) 12/29/2027    Pneumococcal med risk  Completed    Hepatitis C screen  Completed    HIV screen  Completed

## 2018-09-25 ASSESSMENT — ENCOUNTER SYMPTOMS
ALLERGIC/IMMUNOLOGIC NEGATIVE: 1
BACK PAIN: 1
GASTROINTESTINAL NEGATIVE: 1
RESPIRATORY NEGATIVE: 1
EYES NEGATIVE: 1

## 2018-09-25 NOTE — PROGRESS NOTES
normal reflexes. Skin: Skin is warm and dry. Psychiatric:   Anxiety with underlying chronic pain syndrome   Vitals reviewed. Assessment:       Diagnosis Orders   1. Trochanteric bursitis of left hip     2. It band syndrome, left     3. Other specified rheumatoid arthritis, multiple sites ([address]     4. Chronic obstructive pulmonary disease, unspecified COPD type ([address]     5. Acquired scoliosis     6. Impingement syndrome of shoulder region, unspecified laterality     7. Osteopenia, unspecified location     8. On prednisone therapy     9. Long term methotrexate user     10. Hypoalbuminemia     11. NICOLASA on CPAP     12. Iron deficiency anemia due to chronic blood loss     13. Hiatal hernia     14. Gastroesophageal reflux disease without esophagitis             Plan:      29-year-old female is presented for follow-up. Left trochanteric bursitis  Left IT band syndrome. She is established with rheumatology and orthopedic service. I suggested physical therapy that she declined. Advised stretching as demonstrated. COPD. No recent exacerbation. Continue beta agonist and inhaled corticosteroid. Underlying history of rheumatoid arthritis with associated deformities. She is on methotrexate, Arava, tramadol, folic acid and daily prednisone. She is established with rheumatology. She is updated on 6 monthly eye exam  Underlying history of obstructive sleep apnea. She is advised to comply with CPAP. Anemia of chronic disease agent is stable at baseline. Underlying history of rheumatoid arthritis  GERD/hiatal hernia. Stable on H2 blocker. She says that she is tolerating prednisone well.   hypertension well controlled calcium channel blocker and diuretic that she is tolerating well   Underlying history of osteoporosis.   Continue bisphosphonate, calcium and vitamin D.  Med list reviewed advised to continue  Observe and call for any concern  This note is created with a voice recognition program and while intend to generate a document that accurately reflects the content of the visit, no guarantee can be provided that every mistake has been identified and corrected by editing.           Piter Pinon MD

## 2018-12-05 ENCOUNTER — OFFICE VISIT (OUTPATIENT)
Dept: INTERNAL MEDICINE CLINIC | Age: 63
End: 2018-12-05
Payer: MEDICARE

## 2018-12-05 VITALS
SYSTOLIC BLOOD PRESSURE: 132 MMHG | HEIGHT: 63 IN | OXYGEN SATURATION: 98 % | BODY MASS INDEX: 43.05 KG/M2 | RESPIRATION RATE: 24 BRPM | DIASTOLIC BLOOD PRESSURE: 78 MMHG | WEIGHT: 243 LBS | HEART RATE: 88 BPM

## 2018-12-05 DIAGNOSIS — M81.8 OTHER OSTEOPOROSIS WITHOUT CURRENT PATHOLOGICAL FRACTURE: ICD-10-CM

## 2018-12-05 DIAGNOSIS — R07.81 CHEST PAIN, PLEURITIC: ICD-10-CM

## 2018-12-05 DIAGNOSIS — J30.1 SEASONAL ALLERGIC RHINITIS DUE TO POLLEN: ICD-10-CM

## 2018-12-05 DIAGNOSIS — J44.1 CHRONIC OBSTRUCTIVE PULMONARY DISEASE WITH ACUTE EXACERBATION (HCC): Primary | ICD-10-CM

## 2018-12-05 DIAGNOSIS — I10 ESSENTIAL HYPERTENSION: ICD-10-CM

## 2018-12-05 DIAGNOSIS — Z79.52 ON PREDNISONE THERAPY: ICD-10-CM

## 2018-12-05 DIAGNOSIS — K21.9 GASTROESOPHAGEAL REFLUX DISEASE WITHOUT ESOPHAGITIS: ICD-10-CM

## 2018-12-05 DIAGNOSIS — M06.89 OTHER SPECIFIED RHEUMATOID ARTHRITIS, MULTIPLE SITES (HCC): ICD-10-CM

## 2018-12-05 DIAGNOSIS — M41.9 ACQUIRED SCOLIOSIS: ICD-10-CM

## 2018-12-05 DIAGNOSIS — D50.0 IRON DEFICIENCY ANEMIA DUE TO CHRONIC BLOOD LOSS: ICD-10-CM

## 2018-12-05 PROBLEM — M85.80 OSTEOPENIA: Status: RESOLVED | Noted: 2018-06-25 | Resolved: 2018-12-05

## 2018-12-05 PROCEDURE — 1036F TOBACCO NON-USER: CPT | Performed by: FAMILY MEDICINE

## 2018-12-05 PROCEDURE — 99214 OFFICE O/P EST MOD 30 MIN: CPT | Performed by: FAMILY MEDICINE

## 2018-12-05 PROCEDURE — 3017F COLORECTAL CA SCREEN DOC REV: CPT | Performed by: FAMILY MEDICINE

## 2018-12-05 PROCEDURE — 3023F SPIROM DOC REV: CPT | Performed by: FAMILY MEDICINE

## 2018-12-05 PROCEDURE — G8417 CALC BMI ABV UP PARAM F/U: HCPCS | Performed by: FAMILY MEDICINE

## 2018-12-05 PROCEDURE — G8484 FLU IMMUNIZE NO ADMIN: HCPCS | Performed by: FAMILY MEDICINE

## 2018-12-05 PROCEDURE — G8427 DOCREV CUR MEDS BY ELIG CLIN: HCPCS | Performed by: FAMILY MEDICINE

## 2018-12-05 PROCEDURE — G8926 SPIRO NO PERF OR DOC: HCPCS | Performed by: FAMILY MEDICINE

## 2018-12-05 RX ORDER — AZITHROMYCIN 250 MG/1
TABLET, FILM COATED ORAL
Qty: 1 PACKET | Refills: 0 | Status: SHIPPED | OUTPATIENT
Start: 2018-12-05 | End: 2019-05-31 | Stop reason: ALTCHOICE

## 2018-12-05 RX ORDER — PREDNISONE 10 MG/1
10 TABLET ORAL DAILY
Qty: 6 TABLET | Refills: 0 | Status: SHIPPED | OUTPATIENT
Start: 2018-12-05 | End: 2018-12-11

## 2018-12-05 RX ORDER — ALBUTEROL SULFATE 90 UG/1
2 AEROSOL, METERED RESPIRATORY (INHALATION) 4 TIMES DAILY PRN
Qty: 3 INHALER | Refills: 1 | Status: SHIPPED | OUTPATIENT
Start: 2018-12-05 | End: 2019-04-19 | Stop reason: SDUPTHER

## 2018-12-05 RX ORDER — BENZONATATE 100 MG/1
100 CAPSULE ORAL 3 TIMES DAILY PRN
Qty: 30 CAPSULE | Refills: 0 | Status: SHIPPED | OUTPATIENT
Start: 2018-12-05 | End: 2018-12-12

## 2018-12-05 ASSESSMENT — ENCOUNTER SYMPTOMS
SHORTNESS OF BREATH: 1
COUGH: 1
SORE THROAT: 1
BACK PAIN: 1
EYES NEGATIVE: 1
GASTROINTESTINAL NEGATIVE: 1

## 2018-12-05 NOTE — PROGRESS NOTES
uterus normal.   Musculoskeletal: Normal range of motion. History of rheumatoid arthritis. She is on 5 mg prednisone daily as provided by rheumatology. No recent flare  Underlying degenerative   Neurological: She is alert and oriented to person, place, and time. She has normal reflexes. Skin: Skin is warm and dry. Psychiatric: Judgment normal.   Anxious   Vitals reviewed. Assessment:       Diagnosis Orders   1. Chronic obstructive pulmonary disease with acute exacerbation (HCC)  XR CHEST STANDARD (2 VW)   2. Chest pain, pleuritic     3. Other specified rheumatoid arthritis, multiple sites ([facility] [address]     4. Seasonal allergic rhinitis due to pollen     5. Acquired scoliosis     6. Other osteoporosis without current pathological fracture     7. On prednisone therapy     8. Essential hypertension     9. Iron deficiency anemia due to chronic blood loss     10. Gastroesophageal reflux disease without esophagitis             Plan:      30-year-old former smoker with underlying history significant for COPD on beta agonist and inhaled corticosteroids as well as nebulizer is presented with acute exacerbation. Patient stated that recently she was surrounded by smokers. Overall she is well compensated, afebrile hemodynamically stable. Reassurance provided. I have ordered x-rays, azithromycin and burst oral steroids. Continue beta agonist and inhaled corticosteroids as well as albuterol nebulizer when necessary. Consume generous amount of fluid, saltwater gargles, nasal saline. Underlying history of rheumatoid arthritis. She is chronically prednisone dependent, Arava as provided by rheumatology. No recent flare  Degenerative polyarthralgia. Osteoporosis on bisphosphonate, calcium and vitamin D. Continue activity as tolerated. Anemia of chronic disease with underlying rheumatoid arthritis. H&H is stable at baseline  GERD stable on proton pump inhibitor.   She denies epigastric symptoms, tarry stool or weight loss  She looks anxious however, denies being depressed  Underlying history of allergies. Flare. Continue Singulair. Steroid  Hypertension well-controlled calcium channel blocker and diuretics   allergic rhinitis. Continue Flonase, nasal saline  Further recommendations to follow  She may go to emergency room for any worsening of symptoms  Call for any concern  This note is created with a voice recognition program and while intend to generate a document that accurately reflects the content of the visit, no guarantee can be provided that every mistake has been identified and corrected by editing.              Sivan Grullon MD

## 2018-12-12 ENCOUNTER — HOSPITAL ENCOUNTER (OUTPATIENT)
Age: 63
Discharge: HOME | End: 2018-12-12
Payer: MEDICAID

## 2018-12-12 ENCOUNTER — HOSPITAL ENCOUNTER (OUTPATIENT)
Dept: GENERAL RADIOLOGY | Age: 63
Discharge: HOME | End: 2018-12-14
Payer: MEDICAID

## 2018-12-12 ENCOUNTER — HOSPITAL ENCOUNTER (OUTPATIENT)
Dept: GENERAL RADIOLOGY | Age: 63
End: 2018-12-12
Payer: MEDICAID

## 2018-12-12 DIAGNOSIS — J44.1 CHRONIC OBSTRUCTIVE PULMONARY DISEASE WITH ACUTE EXACERBATION (HCC): ICD-10-CM

## 2018-12-12 PROCEDURE — 71046 X-RAY EXAM CHEST 2 VIEWS: CPT

## 2018-12-24 RX ORDER — DEXLANSOPRAZOLE 60 MG/1
CAPSULE, DELAYED RELEASE ORAL
Qty: 30 CAPSULE | Refills: 3 | Status: SHIPPED | OUTPATIENT
Start: 2018-12-24 | End: 2019-04-28 | Stop reason: SDUPTHER

## 2019-02-04 ENCOUNTER — HOSPITAL ENCOUNTER (OUTPATIENT)
Age: 64
Setting detail: SPECIMEN
Discharge: HOME | End: 2019-02-04
Payer: MEDICAID

## 2019-02-05 LAB
CRYSTALS, FLUID: NEGATIVE
SPECIMEN TYPE: NORMAL

## 2019-02-07 LAB
APPEARANCE FLUID: NORMAL
BASO FLUID: NORMAL %
COLOR FLUID: NORMAL
EOSINOPHIL FLUID: NORMAL %
FLUID DIFF COMMENT: NORMAL
LYMPHOCYTES, BODY FLUID: 20 %
MONOCYTE, FLUID: NORMAL %
NEUTROPHIL, FLUID: 18 %
OTHER CELLS FLUID: NORMAL %
RBC FLUID: <3000 /MM3
SPECIMEN TYPE: NORMAL
WBC FLUID: 674 /MM3

## 2019-02-09 LAB
CULTURE: NORMAL
DIRECT EXAM: NORMAL
DIRECT EXAM: NORMAL
Lab: NORMAL
SPECIMEN DESCRIPTION: NORMAL
STATUS: NORMAL

## 2019-03-12 ENCOUNTER — HOSPITAL ENCOUNTER (OUTPATIENT)
Dept: GENERAL RADIOLOGY | Age: 64
End: 2019-03-12
Payer: MEDICAID

## 2019-03-12 ENCOUNTER — HOSPITAL ENCOUNTER (OUTPATIENT)
Dept: GENERAL RADIOLOGY | Age: 64
Discharge: HOME | End: 2019-03-14
Payer: MEDICAID

## 2019-03-12 ENCOUNTER — HOSPITAL ENCOUNTER (OUTPATIENT)
Age: 64
Discharge: HOME | End: 2019-03-14
Payer: MEDICAID

## 2019-03-12 ENCOUNTER — OFFICE VISIT (OUTPATIENT)
Dept: INTERNAL MEDICINE CLINIC | Age: 64
End: 2019-03-12
Payer: MEDICAID

## 2019-03-12 VITALS
WEIGHT: 251.2 LBS | HEART RATE: 89 BPM | SYSTOLIC BLOOD PRESSURE: 130 MMHG | BODY MASS INDEX: 44.51 KG/M2 | OXYGEN SATURATION: 98 % | HEIGHT: 63 IN | RESPIRATION RATE: 24 BRPM | DIASTOLIC BLOOD PRESSURE: 80 MMHG

## 2019-03-12 DIAGNOSIS — E88.09 HYPOALBUMINEMIA: ICD-10-CM

## 2019-03-12 DIAGNOSIS — I01.1 RHEUMATOID AORTITIS: ICD-10-CM

## 2019-03-12 DIAGNOSIS — I10 ESSENTIAL HYPERTENSION: ICD-10-CM

## 2019-03-12 DIAGNOSIS — G47.33 OSA ON CPAP: ICD-10-CM

## 2019-03-12 DIAGNOSIS — K44.9 HIATAL HERNIA: ICD-10-CM

## 2019-03-12 DIAGNOSIS — J31.0 RHINITIS, UNSPECIFIED TYPE: ICD-10-CM

## 2019-03-12 DIAGNOSIS — K21.9 GASTROESOPHAGEAL REFLUX DISEASE WITHOUT ESOPHAGITIS: ICD-10-CM

## 2019-03-12 DIAGNOSIS — Z79.631 LONG TERM METHOTREXATE USER: ICD-10-CM

## 2019-03-12 DIAGNOSIS — J44.1 CHRONIC OBSTRUCTIVE PULMONARY DISEASE WITH ACUTE EXACERBATION (HCC): ICD-10-CM

## 2019-03-12 DIAGNOSIS — M06.9 RHEUMATOID ARTHRITIS INVOLVING MULTIPLE SITES, UNSPECIFIED RHEUMATOID FACTOR PRESENCE: ICD-10-CM

## 2019-03-12 DIAGNOSIS — J30.89 NON-SEASONAL ALLERGIC RHINITIS DUE TO OTHER ALLERGIC TRIGGER: ICD-10-CM

## 2019-03-12 DIAGNOSIS — M81.8 OTHER OSTEOPOROSIS WITHOUT CURRENT PATHOLOGICAL FRACTURE: ICD-10-CM

## 2019-03-12 DIAGNOSIS — J44.1 CHRONIC OBSTRUCTIVE PULMONARY DISEASE WITH ACUTE EXACERBATION (HCC): Primary | ICD-10-CM

## 2019-03-12 DIAGNOSIS — D50.0 IRON DEFICIENCY ANEMIA DUE TO CHRONIC BLOOD LOSS: ICD-10-CM

## 2019-03-12 DIAGNOSIS — Z79.52 ON PREDNISONE THERAPY: ICD-10-CM

## 2019-03-12 DIAGNOSIS — M41.9 ACQUIRED SCOLIOSIS: ICD-10-CM

## 2019-03-12 DIAGNOSIS — Z99.89 OSA ON CPAP: ICD-10-CM

## 2019-03-12 PROBLEM — M06.89 OTHER SPECIFIED RHEUMATOID ARTHRITIS, MULTIPLE SITES (HCC): Status: RESOLVED | Noted: 2017-12-30 | Resolved: 2019-03-12

## 2019-03-12 PROCEDURE — 1036F TOBACCO NON-USER: CPT | Performed by: FAMILY MEDICINE

## 2019-03-12 PROCEDURE — 3023F SPIROM DOC REV: CPT | Performed by: FAMILY MEDICINE

## 2019-03-12 PROCEDURE — 99214 OFFICE O/P EST MOD 30 MIN: CPT | Performed by: FAMILY MEDICINE

## 2019-03-12 PROCEDURE — G8484 FLU IMMUNIZE NO ADMIN: HCPCS | Performed by: FAMILY MEDICINE

## 2019-03-12 PROCEDURE — G8417 CALC BMI ABV UP PARAM F/U: HCPCS | Performed by: FAMILY MEDICINE

## 2019-03-12 PROCEDURE — G8427 DOCREV CUR MEDS BY ELIG CLIN: HCPCS | Performed by: FAMILY MEDICINE

## 2019-03-12 PROCEDURE — 3017F COLORECTAL CA SCREEN DOC REV: CPT | Performed by: FAMILY MEDICINE

## 2019-03-12 PROCEDURE — 71046 X-RAY EXAM CHEST 2 VIEWS: CPT

## 2019-03-12 PROCEDURE — G8926 SPIRO NO PERF OR DOC: HCPCS | Performed by: FAMILY MEDICINE

## 2019-03-12 RX ORDER — BENZONATATE 100 MG/1
100 CAPSULE ORAL 3 TIMES DAILY PRN
Qty: 30 CAPSULE | Refills: 0 | Status: SHIPPED | OUTPATIENT
Start: 2019-03-12 | End: 2019-03-19

## 2019-03-12 RX ORDER — M-VIT,TX,IRON,MINS/CALC/FOLIC 27MG-0.4MG
1 TABLET ORAL DAILY
COMMUNITY

## 2019-03-12 RX ORDER — FLUTICASONE PROPIONATE 50 MCG
1 SPRAY, SUSPENSION (ML) NASAL DAILY
Qty: 1 BOTTLE | Refills: 3 | Status: SHIPPED | OUTPATIENT
Start: 2019-03-12 | End: 2019-12-01 | Stop reason: SDUPTHER

## 2019-03-12 ASSESSMENT — PATIENT HEALTH QUESTIONNAIRE - PHQ9
SUM OF ALL RESPONSES TO PHQ9 QUESTIONS 1 & 2: 0
1. LITTLE INTEREST OR PLEASURE IN DOING THINGS: 0
SUM OF ALL RESPONSES TO PHQ QUESTIONS 1-9: 0
SUM OF ALL RESPONSES TO PHQ QUESTIONS 1-9: 0
2. FEELING DOWN, DEPRESSED OR HOPELESS: 0

## 2019-03-12 ASSESSMENT — ENCOUNTER SYMPTOMS
GASTROINTESTINAL NEGATIVE: 1
SORE THROAT: 1
WHEEZING: 1
COUGH: 1
EYES NEGATIVE: 1
SHORTNESS OF BREATH: 1

## 2019-03-15 ENCOUNTER — TELEPHONE (OUTPATIENT)
Dept: INTERNAL MEDICINE CLINIC | Age: 64
End: 2019-03-15

## 2019-03-15 DIAGNOSIS — H92.09 OTALGIA, UNSPECIFIED LATERALITY: Primary | ICD-10-CM

## 2019-04-19 NOTE — TELEPHONE ENCOUNTER
Last visit: 3/12/18  Last Med refill: 12/18/18  Does patient have enough medication for 72 hours: NA    Next Visit Date:  Future Appointments   Date Time Provider Nicholas Johnson   6/17/2019  9:45 AM Keaton Garcia  [facility] Maintenance   Topic Date Due    Shingles Vaccine (1 of 2) 06/12/2004    Cervical cancer screen  07/16/2019 (Originally [birth year]/12/1975)    Breast cancer screen  06/20/2019    Potassium monitoring  07/09/2019    Creatinine monitoring  07/09/2019    Flu vaccine (Season Ended) 09/01/2019    Colon cancer screen colonoscopy  03/30/2021    Lipid screen  07/09/2023    DTaP/Tdap/Td vaccine (2 - Td) 12/29/2027    Pneumococcal 0-64 years Vaccine  Completed    Hepatitis C screen  Completed    HIV screen  Completed       Hemoglobin A1C (%)   Date Value   07/09/2018 5.5   09/07/2017 5.6   01/19/2016 5.4             ( goal A1C is < 7)   No results found for: LABMICR  LDL Cholesterol (mg/dL)   Date Value   07/09/2018 91   09/07/2017 78       (goal LDL is <100)   AST (U/L)   Date Value   07/09/2018 16     ALT (U/L)   Date Value   07/09/2018 21     BUN (mg/dL)   Date Value   07/09/2018 11     BP Readings from Last 3 Encounters:   03/12/19 130/80   12/05/18 132/78   09/24/18 120/80          (goal 120/80)    All Future Testing planned in [facility]  Lab Frequency Next Occurrence               Patient Active Problem List:     Rheumatoid arthritis involving multiple sites ([facility] [address]     Osteoporosis     GERD (gastroesophageal reflux disease)     Hypertension     Iron deficiency anemia due to chronic blood loss     Rheumatoid aortitis     NICOLASA on CPAP     Hiatal hernia     On prednisone therapy     Long term methotrexate user     Hypoalbuminemia     Acquired scoliosis     Arthritis of wrist     Impingement syndrome of shoulder region     Localized, primary osteoarthritis     Osteoarthritis of foot joint     Osteoarthritis of knee     Plantar fasciitis     Rupture of quadriceps tendon Sinus tarsi syndrome     Sprain of knee and leg     Synovitis     Traumatic rupture of quadriceps tendon     Rhinitis     Chronic obstructive pulmonary disease with acute exacerbation (HCC)

## 2019-04-22 RX ORDER — MONTELUKAST SODIUM 10 MG/1
TABLET ORAL
Qty: 180 TABLET | Refills: 0 | Status: SHIPPED | OUTPATIENT
Start: 2019-04-22 | End: 2019-10-02 | Stop reason: SDUPTHER

## 2019-04-22 NOTE — TELEPHONE ENCOUNTER
Synovitis     Traumatic rupture of quadriceps tendon     Rhinitis     Chronic obstructive pulmonary disease with acute exacerbation ([facility] [address]

## 2019-04-29 RX ORDER — DEXLANSOPRAZOLE 60 MG/1
CAPSULE, DELAYED RELEASE ORAL
Qty: 90 CAPSULE | Refills: 1 | Status: SHIPPED | OUTPATIENT
Start: 2019-04-29 | End: 2019-10-15 | Stop reason: SDUPTHER

## 2019-04-29 NOTE — TELEPHONE ENCOUNTER
Filled 12/24/18 #30 with 3 RF  Seen 3/12/19    Next Visit Date:  Future Appointments   Date Time Provider Nicholas Johnson   6/17/2019  9:45 AM Keaton Garcia MD [facility] [employer] [address] Maintenance   Topic Date Due    Shingles Vaccine (1 of 2) 06/12/2004    Cervical cancer screen  07/16/2019 (Originally [birth year]12/1975)    Breast cancer screen  06/20/2019    Potassium monitoring  07/09/2019    Creatinine monitoring  07/09/2019    Flu vaccine (Season Ended) 09/01/2019    Colon cancer screen colonoscopy  03/30/2021    Lipid screen  07/09/2023    DTaP/Tdap/Td vaccine (2 - Td) 12/29/2027    Pneumococcal 0-64 years Vaccine  Completed    Hepatitis C screen  Completed    HIV screen  Completed       Hemoglobin A1C (%)   Date Value   07/09/2018 5.5   09/07/2017 5.6   01/19/2016 5.4             ( goal A1C is < 7)   No results found for: LABMICR  LDL Cholesterol (mg/dL)   Date Value   07/09/2018 91   09/07/2017 78       (goal LDL is <100)   AST (U/L)   Date Value   07/09/2018 16     ALT (U/L)   Date Value   07/09/2018 21     BUN (mg/dL)   Date Value   07/09/2018 11     BP Readings from Last 3 Encounters:   03/12/19 130/80   12/05/18 132/78   09/24/18 120/80          (goal 120/80)    All Future Testing planned in [facility]  Lab Frequency Next Occurrence               Patient Active Problem List:     Rheumatoid arthritis involving multiple sites ([facility] [address]     Osteoporosis     GERD (gastroesophageal reflux disease)     Hypertension     Iron deficiency anemia due to chronic blood loss     Rheumatoid aortitis     NICOLASA on CPAP     Hiatal hernia     On prednisone therapy     Long term methotrexate user     Hypoalbuminemia     Acquired scoliosis     Arthritis of wrist     Impingement syndrome of shoulder region     Localized, primary osteoarthritis     Osteoarthritis of foot joint     Osteoarthritis of knee     Plantar fasciitis     Rupture of quadriceps tendon     Sinus tarsi syndrome     Sprain of knee and leg Synovitis     Traumatic rupture of quadriceps tendon     Rhinitis     Chronic obstructive pulmonary disease with acute exacerbation ([facility] [address]

## 2019-05-13 ENCOUNTER — OFFICE VISIT (OUTPATIENT)
Dept: INTERNAL MEDICINE CLINIC | Age: 64
End: 2019-05-13
Payer: MEDICAID

## 2019-05-13 VITALS
HEART RATE: 93 BPM | OXYGEN SATURATION: 98 % | WEIGHT: 248 LBS | RESPIRATION RATE: 24 BRPM | SYSTOLIC BLOOD PRESSURE: 140 MMHG | DIASTOLIC BLOOD PRESSURE: 90 MMHG | BODY MASS INDEX: 43.94 KG/M2 | HEIGHT: 63 IN

## 2019-05-13 DIAGNOSIS — M81.8 OTHER OSTEOPOROSIS WITHOUT CURRENT PATHOLOGICAL FRACTURE: ICD-10-CM

## 2019-05-13 DIAGNOSIS — E88.09 HYPOALBUMINEMIA: ICD-10-CM

## 2019-05-13 DIAGNOSIS — K21.9 GASTROESOPHAGEAL REFLUX DISEASE WITHOUT ESOPHAGITIS: ICD-10-CM

## 2019-05-13 DIAGNOSIS — Z12.39 BREAST SCREENING: ICD-10-CM

## 2019-05-13 DIAGNOSIS — S76.112S: ICD-10-CM

## 2019-05-13 DIAGNOSIS — J31.0 RHINITIS, UNSPECIFIED TYPE: ICD-10-CM

## 2019-05-13 DIAGNOSIS — M75.40 IMPINGEMENT SYNDROME OF SHOULDER REGION, UNSPECIFIED LATERALITY: ICD-10-CM

## 2019-05-13 DIAGNOSIS — Z99.89 OSA ON CPAP: ICD-10-CM

## 2019-05-13 DIAGNOSIS — M51.26 LUMBAR DISCOGENIC PAIN SYNDROME: Primary | ICD-10-CM

## 2019-05-13 DIAGNOSIS — M41.9 ACQUIRED SCOLIOSIS: ICD-10-CM

## 2019-05-13 DIAGNOSIS — K44.9 HIATAL HERNIA: ICD-10-CM

## 2019-05-13 DIAGNOSIS — M05.79 RHEUMATOID ARTHRITIS INVOLVING MULTIPLE SITES WITH POSITIVE RHEUMATOID FACTOR (HCC): ICD-10-CM

## 2019-05-13 DIAGNOSIS — Z79.52 ON PREDNISONE THERAPY: ICD-10-CM

## 2019-05-13 DIAGNOSIS — M19.91 LOCALIZED, PRIMARY OSTEOARTHRITIS: ICD-10-CM

## 2019-05-13 DIAGNOSIS — D50.0 IRON DEFICIENCY ANEMIA DUE TO CHRONIC BLOOD LOSS: ICD-10-CM

## 2019-05-13 DIAGNOSIS — G47.33 OSA ON CPAP: ICD-10-CM

## 2019-05-13 DIAGNOSIS — I10 ESSENTIAL HYPERTENSION: ICD-10-CM

## 2019-05-13 DIAGNOSIS — J45.40 MODERATE PERSISTENT ASTHMA WITHOUT COMPLICATION: ICD-10-CM

## 2019-05-13 DIAGNOSIS — Z79.631 LONG TERM METHOTREXATE USER: ICD-10-CM

## 2019-05-13 PROBLEM — M25.579 SINUS TARSI SYNDROME: Status: RESOLVED | Noted: 2018-06-25 | Resolved: 2019-05-13

## 2019-05-13 PROBLEM — S76.119A RUPTURE OF QUADRICEPS TENDON: Status: RESOLVED | Noted: 2018-06-25 | Resolved: 2019-05-13

## 2019-05-13 PROBLEM — S83.90XA SPRAIN OF KNEE AND LEG: Status: RESOLVED | Noted: 2018-06-25 | Resolved: 2019-05-13

## 2019-05-13 PROBLEM — M72.2 PLANTAR FASCIITIS: Status: RESOLVED | Noted: 2018-06-25 | Resolved: 2019-05-13

## 2019-05-13 PROBLEM — M19.039 ARTHRITIS OF WRIST: Status: RESOLVED | Noted: 2018-06-25 | Resolved: 2019-05-13

## 2019-05-13 PROBLEM — J44.1 CHRONIC OBSTRUCTIVE PULMONARY DISEASE WITH ACUTE EXACERBATION (HCC): Status: RESOLVED | Noted: 2019-03-12 | Resolved: 2019-05-13

## 2019-05-13 PROBLEM — J45.909 MODERATE ASTHMA WITHOUT COMPLICATION: Status: ACTIVE | Noted: 2019-05-13

## 2019-05-13 PROBLEM — M17.9 OSTEOARTHRITIS OF KNEE: Status: RESOLVED | Noted: 2018-06-25 | Resolved: 2019-05-13

## 2019-05-13 PROBLEM — M65.9 SYNOVITIS: Status: RESOLVED | Noted: 2018-06-25 | Resolved: 2019-05-13

## 2019-05-13 PROBLEM — M19.079 OSTEOARTHRITIS OF FOOT JOINT: Status: RESOLVED | Noted: 2018-06-25 | Resolved: 2019-05-13

## 2019-05-13 PROCEDURE — 3017F COLORECTAL CA SCREEN DOC REV: CPT | Performed by: FAMILY MEDICINE

## 2019-05-13 PROCEDURE — G8427 DOCREV CUR MEDS BY ELIG CLIN: HCPCS | Performed by: FAMILY MEDICINE

## 2019-05-13 PROCEDURE — G8417 CALC BMI ABV UP PARAM F/U: HCPCS | Performed by: FAMILY MEDICINE

## 2019-05-13 PROCEDURE — 1036F TOBACCO NON-USER: CPT | Performed by: FAMILY MEDICINE

## 2019-05-13 PROCEDURE — 99214 OFFICE O/P EST MOD 30 MIN: CPT | Performed by: FAMILY MEDICINE

## 2019-05-13 ASSESSMENT — ENCOUNTER SYMPTOMS
GASTROINTESTINAL NEGATIVE: 1
RESPIRATORY NEGATIVE: 1
ALLERGIC/IMMUNOLOGIC NEGATIVE: 1
EYES NEGATIVE: 1
BACK PAIN: 1

## 2019-05-13 NOTE — PROGRESS NOTES
Subjective:      Patient ID: Keisha Alva is a 58 y.o. female. Back Pain   This is a chronic problem. The current episode started more than 1 month ago. The problem occurs constantly. The problem has been waxing and waning since onset. The pain is present in the lumbar spine. The quality of the pain is described as cramping. The pain radiates to the left thigh. The pain is at a severity of 7/10. The pain is severe. The pain is the same all the time. The symptoms are aggravated by stress, standing and position. Stiffness is present all day. Associated symptoms include tingling. Risk factors include lack of exercise, poor posture, obesity, menopause, history of steroid use, history of osteoporosis and sedentary lifestyle (Methotrexate, tramadol, daily prednisone as provided by rheumatology). The treatment provided mild relief. Review of Systems   Constitutional: Negative. HENT: Negative. Eyes: Negative. Respiratory: Negative. Cardiovascular: Negative. Gastrointestinal: Negative. Endocrine: Negative. Musculoskeletal: Positive for arthralgias, back pain, gait problem, myalgias and neck pain. Skin: Negative. Allergic/Immunologic: Negative. Neurological: Positive for tingling. Hematological: Negative. Psychiatric/Behavioral: Positive for dysphoric mood. The patient is nervous/anxious. Past family and social history unremarkable. Objective:   Physical Exam   Constitutional: She is oriented to person, place, and time. She appears well-developed and well-nourished. Obesity   HENT:   Head: Normocephalic and atraumatic. Right Ear: External ear normal.   Left Ear: External ear normal.   Mouth/Throat: Oropharynx is clear and moist.   Eyes: Pupils are equal, round, and reactive to light. Conjunctivae and EOM are normal.   Neck: Normal range of motion. Neck supple. Cardiovascular: Normal rate, regular rhythm, normal heart sounds and intact distal pulses. Pulmonary/Chest: Effort normal and breath sounds normal.   Abdominal: Soft. Bowel sounds are normal.   Genitourinary: Vagina normal and uterus normal.   Musculoskeletal: Normal range of motion. Discogenic disease of the lumbar spine with left lower extremity radiculopathy. No apparent sign of myelopathy. Dexter rheumatoid arthritis on methotrexate and daily prednisone and tramadol as provided by rheumatology. Degenerative polyarthralgia. She is also established with orthopedic services. Osteoporosis   Neurological: She is alert and oriented to person, place, and time. She has normal reflexes. Skin: Skin is warm and dry. Nursing note and vitals reviewed. Assessment:       Diagnosis Orders   1. Lumbar discogenic pain syndrome  Shelbie Rodríguez MD, Pain Management, [facility]   2. Rheumatoid arthritis involving multiple sites with positive rheumatoid factor ([facility] [address]  Christiano Pool MD, Pain Management, [facility]    CBC    Comprehensive Metabolic Panel    Hemoglobin A1C    Lipid Panel    TSH without Reflex    Vitamin D 25 Hydroxy    DANNY - Tico Johnson MD, Ophthalmology, [facility]   3. Other osteoporosis without current pathological fracture  Shelbie Rodríguez MD, Pain Management, [facility]   4. Gastroesophageal reflux disease without esophagitis     5. Essential hypertension  CBC    Comprehensive Metabolic Panel    Hemoglobin A1C    Lipid Panel    TSH without Reflex    Vitamin D 25 Hydroxy   6. Iron deficiency anemia due to chronic blood loss     7. NICOLASA on CPAP     8. Hiatal hernia     9. On prednisone therapy  CBC    Comprehensive Metabolic Panel    Hemoglobin A1C    Lipid Panel    TSH without Reflex    Vitamin D 25 Hydroxy    DANNY - Tico Johnson MD, Ophthalmology, [facility]   10. Long term methotrexate user     11. Hypoalbuminemia     12. Acquired scoliosis     13.  Impingement syndrome of shoulder region, unspecified laterality  Shelbie Rodríguez MD, Pain

## 2019-05-13 NOTE — PROGRESS NOTES
Chronic Disease Visit Information    BP Readings from Last 3 Encounters:   03/12/19 130/80   12/05/18 132/78   09/24/18 120/80          Hemoglobin A1C (%)   Date Value   07/09/2018 5.5   09/07/2017 5.6   01/19/2016 5.4     LDL Cholesterol (mg/dL)   Date Value   07/09/2018 91     HDL (mg/dL)   Date Value   07/09/2018 138     BUN (mg/dL)   Date Value   07/09/2018 11     CREATININE (mg/dL)   Date Value   07/09/2018 0.67     Glucose (mg/dL)   Date Value   07/09/2018 92            Have you changed or started any medications since your last visit including any over-the-counter medicines, vitamins, or herbal medicines? no   Are you having any side effects from any of your medications? -  no  Have you stopped taking any of your medications? Is so, why? -  no    Have you seen any other physician or provider since your last visit? No  Have you had any other diagnostic tests since your last visit? No  Have you been seen in the emergency room and/or had an admission to a hospital since we last saw you? No  Have you had your annual diabetic retinal (eye) exam? No  Have you had your routine dental cleaning in the past 6 months? no    Have you activated your [employer] account? If not, what are your barriers?  Yes     Patient Care Team:  Rick Shell MD as PCP - General (Family Medicine)  Jose Barber MD as Consulting Physician (Rheumatology)  Jamila Darden MD as Surgeon (Orthopedic Surgery)  Fox Conrad MD as Consulting Physician (Gastroenterology)  Bill Donaldson MD as Consulting Physician (Cardiology)  Burke Fraser MD as Consulting Physician (Pulmonary Disease)  Oscar Davidson MD as Consulting Physician (Gastroenterology)         Medical History Review  Past Medical, Family, and Social History reviewed and does not contribute to the patient presenting condition    Health Maintenance   Topic Date Due    Shingles Vaccine (1 of 2) 06/12/2004    Cervical cancer screen  07/16/2019 (Originally 6/12/1976)    Breast cancer screen  06/20/2019    Potassium monitoring  07/09/2019    Creatinine monitoring  07/09/2019    Flu vaccine (Season Ended) 09/01/2019    Colon cancer screen colonoscopy  03/30/2021    Lipid screen  07/09/2023    DTaP/Tdap/Td vaccine (2 - Td) 12/29/2027    Pneumococcal 0-64 years Vaccine  Completed    Hepatitis C screen  Completed    HIV screen  Completed

## 2019-05-21 ENCOUNTER — HOSPITAL ENCOUNTER (OUTPATIENT)
Dept: MAMMOGRAPHY | Age: 64
Discharge: HOME | End: 2019-05-23
Payer: MEDICAID

## 2019-05-21 DIAGNOSIS — Z12.39 BREAST SCREENING: ICD-10-CM

## 2019-05-21 PROCEDURE — 77063 BREAST TOMOSYNTHESIS BI: CPT

## 2019-05-31 ENCOUNTER — HOSPITAL ENCOUNTER (OUTPATIENT)
Dept: PAIN MANAGEMENT | Age: 64
Discharge: HOME | End: 2019-05-31
Payer: MEDICARE

## 2019-05-31 VITALS
HEIGHT: 63 IN | HEART RATE: 92 BPM | DIASTOLIC BLOOD PRESSURE: 86 MMHG | SYSTOLIC BLOOD PRESSURE: 142 MMHG | WEIGHT: 248 LBS | BODY MASS INDEX: 43.94 KG/M2 | OXYGEN SATURATION: 97 %

## 2019-05-31 DIAGNOSIS — M54.16 LUMBAR RADICULOPATHY: Primary | ICD-10-CM

## 2019-05-31 DIAGNOSIS — M51.26 LUMBAR DISCOGENIC PAIN SYNDROME: ICD-10-CM

## 2019-05-31 PROCEDURE — 99203 OFFICE O/P NEW LOW 30 MIN: CPT

## 2019-05-31 PROCEDURE — 99204 OFFICE O/P NEW MOD 45 MIN: CPT | Performed by: PAIN MEDICINE

## 2019-05-31 ASSESSMENT — ENCOUNTER SYMPTOMS
BLURRED VISION: 0
SORE THROAT: 0
WHEEZING: 1
ABDOMINAL PAIN: 0
BACK PAIN: 1
SPUTUM PRODUCTION: 1

## 2019-06-05 ENCOUNTER — HOSPITAL ENCOUNTER (OUTPATIENT)
Dept: PHYSICAL THERAPY | Age: 64
Setting detail: THERAPIES SERIES
Discharge: HOME | End: 2019-06-05
Payer: MEDICARE

## 2019-06-05 NOTE — FLOWSHEET NOTE
[x] [facility]) [facility]  [address]    [phone]  F: [phone]   [] [address] [facility] [address]   [address]  P: [phone]  F: [phone]  [] [place]  [address]  P: [phone]  F: [phone]   [] [address]  [facility] [address]   [place]: [phone]  F: [phone]  [] [address]  [place]: [phone]   F: [phone]     Physical Therapy Cancel/No Show note    Date: 2019  Patient: Pilar Zambrano  : 1955  MRN: [id number]    Cancels/No Shows to date:     For today's appointment patient:    [x]  Cancelled Initial evaluation    [] Rescheduled appointment    [] No-show     Reason given by patient:    []  Patient ill    []  Conflicting appointment     [x] No transportation      [] Conflict with work    [] No reason given    [] Weather related    [] Other:      Comments:        [] Next appointment was confirmed    Electronically signed by: Ashli Gutierrez PT

## 2019-06-13 RX ORDER — DIAZEPAM 5 MG/1
5 TABLET ORAL ONCE
Qty: 1 TABLET | Refills: 0 | Status: SHIPPED | OUTPATIENT
Start: 2019-06-13 | End: 2019-06-13

## 2019-06-18 ENCOUNTER — HOSPITAL ENCOUNTER (OUTPATIENT)
Dept: PHYSICAL THERAPY | Age: 64
Setting detail: THERAPIES SERIES
Discharge: HOME | End: 2019-06-18
Payer: MEDICARE

## 2019-06-18 PROCEDURE — 97162 PT EVAL MOD COMPLEX 30 MIN: CPT

## 2019-06-18 PROCEDURE — 97110 THERAPEUTIC EXERCISES: CPT

## 2019-06-18 PROCEDURE — 97530 THERAPEUTIC ACTIVITIES: CPT

## 2019-06-18 NOTE — CONSULTS
[x] [facility]) [facility] &  Therapy  [address]  [phone]  F: [phone] [] [address]  [place]   [address]  P: [phone]  F: [phone] [] [place]  [address]  P: [phone]  F: [phone] [] [address]  [facility]   [address]  [place]: [phone]  F: [phone]     Physical Therapy Spine Evaluation    Date:  2019  Patient: Pilar Zambrano  : 1955  MRN: [id number]  Physician: Sharon Spence MD- pain management  Insurance: [facility] Medicare  Medical Diagnosis:  M54.16 (ICD-10-CM) - Lumbar radiculopathy    Rehab Codes: Pain M54.5, M54.17, gait R26.2, posture R29.3, weakness M62.81  Onset Date: Dec 2018  Next 's appt. : ?    Subjective:   CC: Constant pain across lateral left leg to the knee, intermittent LBP, also radiating into left groin when walking. Stifffness due to RA.  HPI: ()The problem occurs constantly. The problem has been gradually worsening since onset. The pain is present in the lumbar spine. The quality of the pain is described as aching. The pain radiates to the left thigh. The pain is moderate. The pain is the same all the time. The symptoms are aggravated by twisting and standing. Stiffness is present all day. Associated symptoms include leg pain and weakness. Previously torn rt quad.    PMHx: [] Unremarkable [] Diabetes [] HTN  [] Pacemaker   [] MI/Heart Problems [] Cancer [x] Arthritis RA [x] Other: Past hip replacement , Rt TKA              [x] Refer to full medical chart  In EPIC   Tests: [] X-Ray: [x] MRI: Scheduled monday [] Other:    Medications: [] Refer to full medical record [] None [] Other:  Allergies:      [x] Refer to full medical record [] None [x] Other:codene  Function:  Hand Dominance  [] Right  [] Left  Working:  [] Normal Duty  [] Light Duty  [] Off D/T Condition  [] Retired  [] Not Employed                  [x]  Disability  [] Other:           Return to work:   Job/ADL Description:  Pain:  [x] Yes  [] No Location: [facility] Pain Rating: (0-10 scale) 8/10  Pain altered Tx:  [] Yes  [x] No  Action:  Symptoms:  [] Improving [x] Worsening [] Same  Better:  [x] AM    [] PM    [] Sit    [] Rise/Sit    []Stand    [] Walk    [x] Lying    [] Other:  Worse: [] AM    [x] PM    [] Sit    [] Rise/Sit    [x]Stand    [x] Walk    [] Lying    [x] Bend                             [] Valsalva    [] Other:  Sleep: [] OK    [x] Disturbed    Objective:      STRENGTH  STRENGTH  ROM    Left Right  Left Right Cervical    C5 Shld Abd   L1-2 Hip Flex 4-4+ 4- Flexion    Shld Flexion   Hip Abd   Extension    Shld IR   L3-4 Knee Ext 4-4+ 4- with lag Rotation L R   Shld ER   L4 Ankle DF 4-4+ 4-4+ Sidebend L R   C6 Elb Flex   L5 EHL   Retraction    C7 Elb Ext   S1 Plant. Flex   Lumbar    C8 EPL   Abdominals   Flexion 100% pain   T1 Fing Abd   Erector Spinae   Extension 100%         Rotation L  R         Sidebend L R         UE/LE WFL                                                                 TESTS (+/-) LEFT RIGHT Not Tested   SLR [x] sit [x] supine - - []   Hamstring (SLR) - - []   SKTC - - []   DKTC - - []   Slump/Dural - - []   SI JT - ? []   GISELLA - - []   Joint Mobility - - []   Cerv. Comp   [x]   Cerv. Distraction   [x]   Cerv. Alar/Transverse   [x]   Vertebral Artery   [x]   Adsons   [x]   Margreta Dredge   [x]   Norm Tests ? Pain ?  Pain No Change Not Tested   RFIS [x] [] [] []   YAKOV [] [] [x] []   RFIL [x] [] [] []   REIL [] [x] [] []   Rep Prot [] [] [] [x]   Rep Retract [] [] [] [x]       OBSERVATION No Deficit Deficit Not Tested Comments   Posture       Forward Head [x] [] []    Rounded Shoulders [x] [] [] Left shoulder high    Kyphosis [] [x] []    Lordosis [] [x] []    Lateral Shift [] [] []    Scoliosis [] [] []    Iliac Crest [] [x] [] Left high   PSIS [] [] []    ASIS [] [] []    Genu Valgus [] [] []    Genu Varus [] [] []    Genu Recurvatum [] [] []    Pronation [] [] []    Supination [] [] []    Leg Length Discrp [] [x] [] Rt leg shorter   Slumped Sitting [x] [] []    Palpation [] [x] [] Rt SI and paraspinals mod tender   Sensation [] [] []    Edema [x] [] []    Neurological [] [] [x]      Gait - lurches with short rt leg. FUNCTION Normal Difficult Unable   Sitting [] [x] []   Standing [] [x] []   Ambulation [] [x] []   Groom/Dress [] [x] []   Lift/Carry [] [x] []   Stairs [] [x] []   Bending [] [x] []   OH reach [] [x] []   Sit to Stand [] [x] []     Comments:Oswestry score 35 = 70% disability    Assessment:  Problems:    [x] ? Back Pain:    [] ? Cervical Pain:    [] ? ROM:     [x] ? Strength:   [x] ? Function:   [x] Postural Deviations  [x] Gait Deviations  [] Other:    STG: (to be met in 10 treatments)  1. ? Pain: Keep pain at 5 or less most times, radicular pain occurring intermittently and less intense  2. ? ROM:  3. ? Strength: LE's test at 4+ for improved function  4. ? Function: Oswestry improves to 50% or better, Able to walk 10-15 min without increased pain,   5.   6. Demonstrate Knowledge of fall prevention  LTG: (to be met in 12 treatments)         1. Independent with Home Exercise Programs    Patient goals: Control and get stronger      Rehab Potential:  [x] Good  [] Fair  [] Poor   Suggested Professional Referral:  [x] No  [] Yes:  Barriers to Goal Achievement:  [x] No  [] Yes:  Domestic Concerns:  [x] No  [] Yes:    Pt. Education:  [x] Plans/Goals, Risks/Benefits discussed  [] Home exercise program  Method of Education: [x] Verbal  [x] Demo  [] Written  Comprehension of Education:  [x] Verbalizes understanding. [x] Demonstrates understanding. [] Needs Review. [] Demonstrates/verbalizes understanding of HEP/Ed previously given.     Treatment Plan:  [x] Therapeutic Exercise    [x] Modalities:  [x] Therapeutic Activity    [] Ultrasound  [x] Electrical Stimulation  [x] Gait Training     []Massage       [x] Lumbar/Cervical Traction  [] Neuromuscular Re-education [x] Cold/hotpack [] Iontophoresis: 4 mg/mL  [x] Instruction in HEP             Dexamethasone Sodium  [x] Manual Therapy             Phosphate  mAmin  [] Aquatic Therapy   [x] Dry Needling [] Other:    []  Medication allergies reviewed for use of    Dexamethasone Sodium Phosphate 4mg/ml     with iontophoresis treatments. Pt is not allergic. Frequency:  2 x/week for 12 visits    Todays Treatment:  Modalities:   Precautions:  Exercises:  Exercise Reps/ Time Weight/ Level Comments   Posture x  Educated and instructed in sitting with lumbar roll and why this aligned position is beneficial, use of pillow under or between knees with lying positions for support of back muscles and spine (Thera Act)   Transfer sit to stand x  Scooting out, chest up, no flexing spine (Thera Act)         SKTC 3 x     Piriformis 3 x     Hip ER 3 x     HS 3 x                 Other:Gait trial heel lift in rt shoe closer to = height, equal after removing left shoe insert also. Notes less pain in back and smoother gait.     Specific Instructions for next treatment: Posture, heel lift, DLS, Norm ext trial, Gait,        Evaluation Complexity:  History (Personal factors, comorbidities) [] 0 [] 1-2 [x] 3+   Exam (limitations, restrictions) [] 1-2 [x] 3 [] 4+   Clinical presentation (progression) [] Stable [x] Evolving  [] Unstable   Decision Making [] Low [x] Moderate [] High    [] Low Complexity [x] Moderate Complexity [] High Complexity       Treatment Charges: Mins Units   [x] Evaluation       []  Low       [x]  Moderate       []  High 40 1   []  Modalities     [x]  Ther Exercise 12    []  Manual Therapy     [x]  Ther Activities 11    []  Aquatics     []  Vasocompression     []  Other       TOTAL TREATMENT TIME: 63    Time in: 5921     Time out: Rose    Electronically signed by: Allegra Navarrete Tello Shirley, PT        Physician Signature:________________________________Date:__________________  By signing above or cosigning this note, I have reviewed this plan of care and certify a need for medically necessary rehabilitation services.      *PLEASE SIGN ABOVE AND FAX BACK ALL PAGES*

## 2019-06-24 ENCOUNTER — HOSPITAL ENCOUNTER (OUTPATIENT)
Dept: MRI IMAGING | Age: 64
Discharge: HOME | End: 2019-06-26
Payer: MEDICARE

## 2019-06-24 DIAGNOSIS — M54.16 LUMBAR RADICULAR PAIN: ICD-10-CM

## 2019-06-24 PROCEDURE — 72148 MRI LUMBAR SPINE W/O DYE: CPT

## 2019-06-25 ENCOUNTER — HOSPITAL ENCOUNTER (OUTPATIENT)
Dept: PHYSICAL THERAPY | Age: 64
Setting detail: THERAPIES SERIES
Discharge: HOME | End: 2019-06-25
Payer: MEDICARE

## 2019-06-25 PROCEDURE — 97110 THERAPEUTIC EXERCISES: CPT

## 2019-06-25 PROCEDURE — 97530 THERAPEUTIC ACTIVITIES: CPT

## 2019-06-27 ENCOUNTER — HOSPITAL ENCOUNTER (OUTPATIENT)
Dept: PHYSICAL THERAPY | Age: 64
Setting detail: THERAPIES SERIES
Discharge: HOME | End: 2019-06-27
Payer: MEDICARE

## 2019-07-01 ENCOUNTER — HOSPITAL ENCOUNTER (OUTPATIENT)
Dept: PHYSICAL THERAPY | Age: 64
Setting detail: THERAPIES SERIES
Discharge: HOME | End: 2019-07-01
Payer: MEDICARE

## 2019-07-01 PROCEDURE — 97110 THERAPEUTIC EXERCISES: CPT

## 2019-07-08 ENCOUNTER — HOSPITAL ENCOUNTER (OUTPATIENT)
Dept: PHYSICAL THERAPY | Age: 64
Setting detail: THERAPIES SERIES
Discharge: HOME | End: 2019-07-08
Payer: MEDICARE

## 2019-07-08 PROCEDURE — 97110 THERAPEUTIC EXERCISES: CPT

## 2019-07-08 NOTE — FLOWSHEET NOTE
[x] [facility]) - [facility] &  Therapy  [address]  [phone]  F: [phone]     Physical Therapy Daily Treatment Note    Date:  2019  Patient Name:  Dagmar Walden    :  1955  MRN: [id number]  Physician: Yanely Baker - pain management     Insurance: [employer] Medicare   Diagnosis: Lumbar radiculopathy    Onset Date: 2018   Next Dr. Migdalia Morales: ?  Visit# / total visits:   Cancels/No Shows: 0/0    Subjective:    Pain:  [x] Yes  [] No Location: [facility] only Pain Rating: (0-10 scale) 5/10  Pain altered Tx:  [x] No  [] Yes  Action:  Comments: Patient reports today with minimal change in pain. Noted that it never improves only stays the same or worsens. MRI -   Multilevel degenerative disc disease with associated multilevel degenerative   facet and ligamentous hypertrophy without significant canal stenosis.       Moderate to severe left foraminal narrowing at L3-4, moderate left and   moderate to severe right foraminal narrowing at L4-5, and mild left and   moderate right foraminal narrowing at L5-S1.            Objective:   Modalities:   Precautions:  Exercises:  Exercise Reps/ Time Weight/ Level Comments   Nustep  5' L1    Supine       Single knee to chest 5 reps  Towel/pillowcase for the right lower;     Piriformis - figure 4 5 reps each 30 seconds     Hip external rotation 3 reps 5 seconds Patient states she feels no pull - added pillowcase for increased pull - patient able to feel pull;     Neutral spine 10 reps      Hamstrings stretch 3 reps 15 seconds      DLS - bracing x          - alt arms 10 x 1#         -alt legs 10 x 1#       - opposite arms/legs  10 x 1#    Sitting      Sitting neutral spine - unsupported   Verbal instruct with demo for home use; written instructions/education issued to the patient   Sitting neutral spine with lean   Verbal instruct with demo for home use; written instructions/education issued to the patient   Sit to stand

## 2019-07-11 ENCOUNTER — HOSPITAL ENCOUNTER (OUTPATIENT)
Dept: PHYSICAL THERAPY | Age: 64
Setting detail: THERAPIES SERIES
Discharge: HOME | End: 2019-07-11
Payer: MEDICARE

## 2019-07-11 PROCEDURE — 97110 THERAPEUTIC EXERCISES: CPT

## 2019-07-11 NOTE — FLOWSHEET NOTE
stand 5x  Reviewed with patient,    Piriformis stretch       LAQ 10 x 1#    DLS- bracing x     marching 10 x     Hip ER 10 x           Prone      Prone lying  2 min     Prone on elbows 2 min     Prone press-up 2x10     Hip Ext 10x ea 1# With and without glute bias         Standing:      3-way hip 15x [place]    TG Squats 15x     Other-        Specific Instructions for next treatment:Posture, heel lift, DLS, Gait, fall prevention        Treatment Charges: Mins Units   []  Modalities     [x]  Ther Exercise 40 3   []  Manual Therapy     []  Ther Activities     []  Aquatics     []  Vasocompression     []  Other     Total Treatment time 40 3       Assessment: [x] Progressing toward goals. Therex per chart to increase B hip and core strength and ROM. Added standing exercises and prone hip ext to increase hip strength and core stability. Patient demonstrated proper mechanics with sit to stand, keeping spine neutral and flexing hip/knees for leverage to complete transfer. Patient reported no pain post treatment. [] No change. [] Other:    STG: (to be met in 10 treatments)  1. ? Pain: Keep pain at 5 or less most times, radicular pain occurring intermittently and less intense  2. ? ROM:  3. ? Strength: LE's test at 4+ for improved function  4. ? Function: Oswestry improves to 50% or better, Able to walk 10-15 min without increased pain,    5. Demonstrate Knowledge of fall prevention  LTG: (to be met in 12 treatments)         1. Independent with Home Exercise Programs      Pt. Education:  [x] Yes  [] No  [x] Reviewed Prior home exercise program/education/instructions  Method of Education: [x] Verbal  [x] Demo  [] Written  Comprehension of Education:  [x] Verbalizes understanding. [] Demonstrates understanding. [] Needs review. [x] Demonstrates/verbalizes HEP/Ed previously given. Issued Supine DLS, prone press-ups and prone hip ext for HEP. Plan: [x] Continue per plan of care.    [] Other:       Time In: 0902            Time Out: 10:55      Electronically signed by:  Milvia Ennis PTA

## 2019-07-15 ENCOUNTER — HOSPITAL ENCOUNTER (OUTPATIENT)
Age: 64
Setting detail: SPECIMEN
Discharge: HOME | End: 2019-07-15
Payer: MEDICARE

## 2019-07-15 DIAGNOSIS — I10 ESSENTIAL HYPERTENSION: ICD-10-CM

## 2019-07-15 DIAGNOSIS — Z79.52 ON PREDNISONE THERAPY: ICD-10-CM

## 2019-07-15 DIAGNOSIS — M05.79 RHEUMATOID ARTHRITIS INVOLVING MULTIPLE SITES WITH POSITIVE RHEUMATOID FACTOR (HCC): ICD-10-CM

## 2019-07-15 LAB
ALBUMIN SERPL-MCNC: 4.2 G/DL (ref 3.5–5.2)
ALBUMIN/GLOBULIN RATIO: 1.2 (ref 1–2.5)
ALP BLD-CCNC: 77 U/L (ref 35–104)
ALT SERPL-CCNC: 16 U/L (ref 5–33)
ANION GAP SERPL CALCULATED.3IONS-SCNC: 19 MMOL/L (ref 9–17)
AST SERPL-CCNC: 19 U/L
BILIRUB SERPL-MCNC: 0.26 MG/DL (ref 0.3–1.2)
BUN BLDV-MCNC: 8 MG/DL (ref 8–23)
BUN/CREAT BLD: ABNORMAL (ref 9–20)
CALCIUM SERPL-MCNC: 10.2 MG/DL (ref 8.6–10.4)
CHLORIDE BLD-SCNC: 109 MMOL/L (ref 98–107)
CHOLESTEROL/HDL RATIO: 1.8
CHOLESTEROL: 228 MG/DL
CO2: 21 MMOL/L (ref 20–31)
CREAT SERPL-MCNC: 0.66 MG/DL (ref 0.5–0.9)
ESTIMATED AVERAGE GLUCOSE: 108 MG/DL
GFR AFRICAN AMERICAN: >60 ML/MIN
GFR NON-AFRICAN AMERICAN: >60 ML/MIN
GFR SERPL CREATININE-BSD FRML MDRD: ABNORMAL ML/MIN/{1.73_M2}
GFR SERPL CREATININE-BSD FRML MDRD: ABNORMAL ML/MIN/{1.73_M2}
GLUCOSE BLD-MCNC: 94 MG/DL (ref 70–99)
HBA1C MFR BLD: 5.4 % (ref 4–6)
HCT VFR BLD CALC: 42.6 % (ref 36.3–47.1)
HDLC SERPL-MCNC: 129 MG/DL
HEMOGLOBIN: 12.9 G/DL (ref 11.9–15.1)
LDL CHOLESTEROL: 85 MG/DL (ref 0–130)
MCH RBC QN AUTO: 29.9 PG (ref 25.2–33.5)
MCHC RBC AUTO-ENTMCNC: 30.3 G/DL (ref 28.4–34.8)
MCV RBC AUTO: 98.8 FL (ref 82.6–102.9)
NRBC AUTOMATED: 0 PER 100 WBC
PDW BLD-RTO: 14.9 % (ref 11.8–14.4)
PLATELET # BLD: 316 K/UL (ref 138–453)
PMV BLD AUTO: 13 FL (ref 8.1–13.5)
POTASSIUM SERPL-SCNC: 4.3 MMOL/L (ref 3.7–5.3)
RBC # BLD: 4.31 M/UL (ref 3.95–5.11)
SODIUM BLD-SCNC: 149 MMOL/L (ref 135–144)
TOTAL PROTEIN: 7.6 G/DL (ref 6.4–8.3)
TRIGL SERPL-MCNC: 68 MG/DL
TSH SERPL DL<=0.05 MIU/L-ACNC: 1.42 MIU/L (ref 0.3–5)
VITAMIN D 25-HYDROXY: 42.1 NG/ML (ref 30–100)
VLDLC SERPL CALC-MCNC: ABNORMAL MG/DL (ref 1–30)
WBC # BLD: 11.7 K/UL (ref 3.5–11.3)

## 2019-07-16 ENCOUNTER — HOSPITAL ENCOUNTER (OUTPATIENT)
Dept: PHYSICAL THERAPY | Age: 64
Setting detail: THERAPIES SERIES
Discharge: HOME | End: 2019-07-16
Payer: MEDICARE

## 2019-07-16 NOTE — FLOWSHEET NOTE
[x] [address]  [address]    [phone]  F: [phone]     Physical Therapy Cancel/No Show note    Date: 2019  Patient: Tab Orellana  : 1955  MRN: [id number]    Cancels/No Shows to date: 3/0    For today's appointment patient:    [x]  Cancelled    [] Rescheduled appointment    [] No-show     Reason given by patient:    []  Patient ill    []  Conflicting appointment    [x] No transportation      [] Conflict with work    [] No reason given    [] Weather related    [] Other:      Comments:       [] Next appointment was confirmed    Electronically signed by: Ashlee Diop PT

## 2019-07-18 ENCOUNTER — HOSPITAL ENCOUNTER (OUTPATIENT)
Dept: PHYSICAL THERAPY | Age: 64
Setting detail: THERAPIES SERIES
Discharge: HOME | End: 2019-07-18
Payer: MEDICARE

## 2019-07-18 PROCEDURE — 97110 THERAPEUTIC EXERCISES: CPT

## 2019-07-25 ENCOUNTER — APPOINTMENT (OUTPATIENT)
Dept: PHYSICAL THERAPY | Age: 64
End: 2019-07-25
Payer: MEDICARE

## 2019-07-30 ENCOUNTER — APPOINTMENT (OUTPATIENT)
Dept: PHYSICAL THERAPY | Age: 64
End: 2019-07-30
Payer: MEDICARE

## 2019-07-31 ENCOUNTER — APPOINTMENT (OUTPATIENT)
Dept: PHYSICAL THERAPY | Age: 64
End: 2019-07-31
Payer: MEDICARE

## 2019-08-13 ENCOUNTER — OFFICE VISIT (OUTPATIENT)
Dept: INTERNAL MEDICINE CLINIC | Age: 64
End: 2019-08-13
Payer: MEDICARE

## 2019-08-13 ENCOUNTER — HOSPITAL ENCOUNTER (OUTPATIENT)
Dept: CT IMAGING | Age: 64
End: 2019-08-13
Payer: MEDICARE

## 2019-08-13 ENCOUNTER — HOSPITAL ENCOUNTER (OUTPATIENT)
Dept: CT IMAGING | Age: 64
Discharge: HOME | End: 2019-08-15
Payer: MEDICARE

## 2019-08-13 VITALS
WEIGHT: 245.2 LBS | DIASTOLIC BLOOD PRESSURE: 80 MMHG | HEIGHT: 63 IN | OXYGEN SATURATION: 100 % | HEART RATE: 99 BPM | SYSTOLIC BLOOD PRESSURE: 130 MMHG | RESPIRATION RATE: 24 BRPM | BODY MASS INDEX: 43.45 KG/M2

## 2019-08-13 DIAGNOSIS — I01.1 RHEUMATOID AORTITIS: ICD-10-CM

## 2019-08-13 DIAGNOSIS — D50.0 IRON DEFICIENCY ANEMIA DUE TO CHRONIC BLOOD LOSS: ICD-10-CM

## 2019-08-13 DIAGNOSIS — Z79.631 LONG TERM METHOTREXATE USER: ICD-10-CM

## 2019-08-13 DIAGNOSIS — G47.33 OSA ON CPAP: ICD-10-CM

## 2019-08-13 DIAGNOSIS — M19.072 ARTHRITIS OF LEFT ANKLE: ICD-10-CM

## 2019-08-13 DIAGNOSIS — K44.9 HIATAL HERNIA: ICD-10-CM

## 2019-08-13 DIAGNOSIS — J30.2 SEASONAL ALLERGIC RHINITIS, UNSPECIFIED TRIGGER: ICD-10-CM

## 2019-08-13 DIAGNOSIS — Z79.52 ON PREDNISONE THERAPY: ICD-10-CM

## 2019-08-13 DIAGNOSIS — I10 ESSENTIAL HYPERTENSION: Primary | ICD-10-CM

## 2019-08-13 DIAGNOSIS — M51.26 LUMBAR DISCOGENIC PAIN SYNDROME: ICD-10-CM

## 2019-08-13 DIAGNOSIS — E88.09 HYPOALBUMINEMIA: ICD-10-CM

## 2019-08-13 DIAGNOSIS — K21.9 GASTROESOPHAGEAL REFLUX DISEASE WITHOUT ESOPHAGITIS: ICD-10-CM

## 2019-08-13 DIAGNOSIS — J45.40 MODERATE PERSISTENT ASTHMA WITHOUT COMPLICATION: ICD-10-CM

## 2019-08-13 DIAGNOSIS — M41.9 ACQUIRED SCOLIOSIS: ICD-10-CM

## 2019-08-13 DIAGNOSIS — M81.8 OTHER OSTEOPOROSIS WITHOUT CURRENT PATHOLOGICAL FRACTURE: ICD-10-CM

## 2019-08-13 DIAGNOSIS — M75.40 IMPINGEMENT SYNDROME OF SHOULDER REGION, UNSPECIFIED LATERALITY: ICD-10-CM

## 2019-08-13 DIAGNOSIS — Z99.89 OSA ON CPAP: ICD-10-CM

## 2019-08-13 DIAGNOSIS — M06.9 RHEUMATOID ARTHRITIS INVOLVING MULTIPLE SITES, UNSPECIFIED RHEUMATOID FACTOR PRESENCE: ICD-10-CM

## 2019-08-13 DIAGNOSIS — S76.112S: ICD-10-CM

## 2019-08-13 PROCEDURE — G8417 CALC BMI ABV UP PARAM F/U: HCPCS | Performed by: FAMILY MEDICINE

## 2019-08-13 PROCEDURE — 73700 CT LOWER EXTREMITY W/O DYE: CPT

## 2019-08-13 PROCEDURE — 1036F TOBACCO NON-USER: CPT | Performed by: FAMILY MEDICINE

## 2019-08-13 PROCEDURE — 1123F ACP DISCUSS/DSCN MKR DOCD: CPT | Performed by: FAMILY MEDICINE

## 2019-08-13 PROCEDURE — 3017F COLORECTAL CA SCREEN DOC REV: CPT | Performed by: FAMILY MEDICINE

## 2019-08-13 PROCEDURE — 99214 OFFICE O/P EST MOD 30 MIN: CPT | Performed by: FAMILY MEDICINE

## 2019-08-13 PROCEDURE — 1090F PRES/ABSN URINE INCON ASSESS: CPT | Performed by: FAMILY MEDICINE

## 2019-08-13 PROCEDURE — G8427 DOCREV CUR MEDS BY ELIG CLIN: HCPCS | Performed by: FAMILY MEDICINE

## 2019-08-13 PROCEDURE — 4040F PNEUMOC VAC/ADMIN/RCVD: CPT | Performed by: FAMILY MEDICINE

## 2019-08-13 PROCEDURE — G8400 PT W/DXA NO RESULTS DOC: HCPCS | Performed by: FAMILY MEDICINE

## 2019-08-13 ASSESSMENT — ENCOUNTER SYMPTOMS
EYES NEGATIVE: 1
COUGH: 1
BACK PAIN: 1
GASTROINTESTINAL NEGATIVE: 1
ALLERGIC/IMMUNOLOGIC NEGATIVE: 1

## 2019-08-13 NOTE — PROGRESS NOTES
Subjective:      Patient ID: Kate Green is a 71 y.o. female. Asthma   She complains of cough. This is a chronic problem. The current episode started more than 1 month ago. The problem occurs intermittently. The problem has been waxing and waning. The cough is non-productive. Associated symptoms include malaise/fatigue and myalgias. Her symptoms are aggravated by emotional stress, change in weather and pollen. Her symptoms are alleviated by anxiolytics, beta-agonist and steroid inhaler. She reports moderate improvement on treatment. Risk factors: Former smoker. Her past medical history is significant for asthma. Review of Systems   Constitutional: Positive for malaise/fatigue. HENT: Negative. Eyes: Negative. Respiratory: Positive for cough. Cardiovascular: Negative. Gastrointestinal: Negative. Endocrine: Negative. Musculoskeletal: Positive for arthralgias, back pain, gait problem and myalgias. Skin: Negative. Allergic/Immunologic: Negative. Hematological: Negative. Psychiatric/Behavioral: Positive for dysphoric mood. The patient is nervous/anxious. Past family and social history unremarkable. Diagnosis Orders   1. Essential hypertension     2. Rheumatoid aortitis     3. NICOLASA on CPAP     4. Hiatal hernia     5. Seasonal allergic rhinitis, unspecified trigger     6. Moderate persistent asthma without complication     7. Gastroesophageal reflux disease without esophagitis     8. Rheumatoid arthritis involving multiple sites, unspecified rheumatoid factor presence (HCC)     9. Other osteoporosis without current pathological fracture     10. Acquired scoliosis     11. Traumatic rupture of left quadriceps tendon, sequela     12. Iron deficiency anemia due to chronic blood loss     13. On prednisone therapy     14. Long term methotrexate user     15. Hypoalbuminemia     16. Impingement syndrome of shoulder region, unspecified laterality     17.  Lumbar discogenic pain

## 2019-10-03 RX ORDER — MONTELUKAST SODIUM 10 MG/1
TABLET ORAL
Qty: 180 TABLET | Refills: 0 | Status: SHIPPED | OUTPATIENT
Start: 2019-10-03 | End: 2020-05-07 | Stop reason: SDUPTHER

## 2019-10-15 RX ORDER — DEXLANSOPRAZOLE 60 MG/1
CAPSULE, DELAYED RELEASE ORAL
Qty: 90 CAPSULE | Refills: 1 | Status: SHIPPED | OUTPATIENT
Start: 2019-10-15 | End: 2020-05-18 | Stop reason: SDUPTHER

## 2019-12-02 RX ORDER — FLUTICASONE PROPIONATE 50 MCG
SPRAY, SUSPENSION (ML) NASAL
Qty: 16 G | Refills: 1 | Status: SHIPPED | OUTPATIENT
Start: 2019-12-02 | End: 2020-05-18 | Stop reason: SDUPTHER

## 2020-04-30 ENCOUNTER — TELEPHONE (OUTPATIENT)
Dept: INTERNAL MEDICINE CLINIC | Age: 65
End: 2020-04-30

## 2020-05-07 RX ORDER — MONTELUKAST SODIUM 10 MG/1
TABLET ORAL
Qty: 20 TABLET | Refills: 0 | Status: SHIPPED | OUTPATIENT
Start: 2020-05-07 | End: 2020-05-18 | Stop reason: SDUPTHER

## 2020-05-18 ENCOUNTER — TELEMEDICINE (OUTPATIENT)
Dept: INTERNAL MEDICINE CLINIC | Age: 65
End: 2020-05-18
Payer: MEDICARE

## 2020-05-18 PROCEDURE — 3017F COLORECTAL CA SCREEN DOC REV: CPT | Performed by: FAMILY MEDICINE

## 2020-05-18 PROCEDURE — 99214 OFFICE O/P EST MOD 30 MIN: CPT | Performed by: FAMILY MEDICINE

## 2020-05-18 PROCEDURE — G8400 PT W/DXA NO RESULTS DOC: HCPCS | Performed by: FAMILY MEDICINE

## 2020-05-18 PROCEDURE — 1123F ACP DISCUSS/DSCN MKR DOCD: CPT | Performed by: FAMILY MEDICINE

## 2020-05-18 PROCEDURE — G8428 CUR MEDS NOT DOCUMENT: HCPCS | Performed by: FAMILY MEDICINE

## 2020-05-18 PROCEDURE — 1090F PRES/ABSN URINE INCON ASSESS: CPT | Performed by: FAMILY MEDICINE

## 2020-05-18 PROCEDURE — 1036F TOBACCO NON-USER: CPT | Performed by: FAMILY MEDICINE

## 2020-05-18 PROCEDURE — 4040F PNEUMOC VAC/ADMIN/RCVD: CPT | Performed by: FAMILY MEDICINE

## 2020-05-18 PROCEDURE — G8417 CALC BMI ABV UP PARAM F/U: HCPCS | Performed by: FAMILY MEDICINE

## 2020-05-18 RX ORDER — FLUTICASONE PROPIONATE 50 MCG
SPRAY, SUSPENSION (ML) NASAL
Qty: 16 G | Refills: 2 | Status: SHIPPED | OUTPATIENT
Start: 2020-05-18 | End: 2020-10-21

## 2020-05-18 RX ORDER — MONTELUKAST SODIUM 10 MG/1
TABLET ORAL
Qty: 90 TABLET | Refills: 0 | Status: SHIPPED | OUTPATIENT
Start: 2020-05-18 | End: 2020-08-10

## 2020-05-18 RX ORDER — DEXLANSOPRAZOLE 60 MG/1
CAPSULE, DELAYED RELEASE ORAL
Qty: 90 CAPSULE | Refills: 0 | Status: SHIPPED | OUTPATIENT
Start: 2020-05-18 | End: 2020-08-10

## 2020-05-18 RX ORDER — ALBUTEROL SULFATE 90 UG/1
AEROSOL, METERED RESPIRATORY (INHALATION)
Qty: 25.5 G | Refills: 2 | Status: SHIPPED | OUTPATIENT
Start: 2020-05-18 | End: 2020-09-04

## 2020-05-21 ENCOUNTER — HOSPITAL ENCOUNTER (OUTPATIENT)
Age: 65
Discharge: HOME | End: 2020-05-21
Payer: MEDICARE

## 2020-05-21 LAB
ALBUMIN SERPL-MCNC: 3.9 G/DL (ref 3.5–5.2)
ALBUMIN SERPL-MCNC: 3.9 G/DL (ref 3.5–5.2)
ALBUMIN/GLOBULIN RATIO: 1.1 (ref 1–2.5)
ALP BLD-CCNC: 74 U/L (ref 35–104)
ALP BLD-CCNC: 74 U/L (ref 35–104)
ALT SERPL-CCNC: 16 U/L (ref 5–33)
ALT SERPL-CCNC: 16 U/L (ref 5–33)
ANION GAP SERPL CALCULATED.3IONS-SCNC: 11 MMOL/L (ref 9–17)
AST SERPL-CCNC: 23 U/L
AST SERPL-CCNC: 23 U/L
BILIRUB SERPL-MCNC: 0.3 MG/DL (ref 0.3–1.2)
BUN BLDV-MCNC: 15 MG/DL (ref 8–23)
BUN/CREAT BLD: NORMAL (ref 9–20)
CALCIUM SERPL-MCNC: 9.5 MG/DL (ref 8.6–10.4)
CHLORIDE BLD-SCNC: 105 MMOL/L (ref 98–107)
CHOLESTEROL/HDL RATIO: 1.8
CHOLESTEROL: 230 MG/DL
CO2: 23 MMOL/L (ref 20–31)
CREAT SERPL-MCNC: 0.81 MG/DL (ref 0.5–0.9)
CREAT SERPL-MCNC: 0.81 MG/DL (ref 0.5–0.9)
ESTIMATED AVERAGE GLUCOSE: 108 MG/DL
GFR AFRICAN AMERICAN: >60 ML/MIN
GFR AFRICAN AMERICAN: >60 ML/MIN
GFR NON-AFRICAN AMERICAN: >60 ML/MIN
GFR NON-AFRICAN AMERICAN: >60 ML/MIN
GFR SERPL CREATININE-BSD FRML MDRD: NORMAL ML/MIN/{1.73_M2}
GLUCOSE BLD-MCNC: 87 MG/DL (ref 70–99)
HBA1C MFR BLD: 5.4 % (ref 4–6)
HCT VFR BLD CALC: 40.3 % (ref 36.3–47.1)
HCT VFR BLD CALC: 40.3 % (ref 36.3–47.1)
HDLC SERPL-MCNC: 130 MG/DL
HEMOGLOBIN: 12.3 G/DL (ref 11.9–15.1)
HEMOGLOBIN: 12.3 G/DL (ref 11.9–15.1)
LDL CHOLESTEROL: 90 MG/DL (ref 0–130)
MCH RBC QN AUTO: 30.8 PG (ref 25.2–33.5)
MCH RBC QN AUTO: 30.8 PG (ref 25.2–33.5)
MCHC RBC AUTO-ENTMCNC: 30.5 G/DL (ref 28.4–34.8)
MCHC RBC AUTO-ENTMCNC: 30.5 G/DL (ref 28.4–34.8)
MCV RBC AUTO: 100.8 FL (ref 82.6–102.9)
MCV RBC AUTO: 100.8 FL (ref 82.6–102.9)
NRBC AUTOMATED: 0 PER 100 WBC
NRBC AUTOMATED: 0 PER 100 WBC
PDW BLD-RTO: 15.8 % (ref 11.8–14.4)
PDW BLD-RTO: 15.8 % (ref 11.8–14.4)
PLATELET # BLD: 289 K/UL (ref 138–453)
PLATELET # BLD: 289 K/UL (ref 138–453)
PMV BLD AUTO: 12.4 FL (ref 8.1–13.5)
PMV BLD AUTO: 12.4 FL (ref 8.1–13.5)
POTASSIUM SERPL-SCNC: 4.2 MMOL/L (ref 3.7–5.3)
RBC # BLD: 4 M/UL (ref 3.95–5.11)
RBC # BLD: 4 M/UL (ref 3.95–5.11)
SODIUM BLD-SCNC: 139 MMOL/L (ref 135–144)
TOTAL PROTEIN: 7.4 G/DL (ref 6.4–8.3)
TRIGL SERPL-MCNC: 52 MG/DL
TSH SERPL DL<=0.05 MIU/L-ACNC: 3.16 MIU/L (ref 0.3–5)
VLDLC SERPL CALC-MCNC: ABNORMAL MG/DL (ref 1–30)
WBC # BLD: 11 K/UL (ref 3.5–11.3)
WBC # BLD: 11 K/UL (ref 3.5–11.3)

## 2020-05-21 PROCEDURE — 80053 COMPREHEN METABOLIC PANEL: CPT

## 2020-05-21 PROCEDURE — 80061 LIPID PANEL: CPT

## 2020-05-21 PROCEDURE — 83036 HEMOGLOBIN GLYCOSYLATED A1C: CPT

## 2020-05-21 PROCEDURE — 84443 ASSAY THYROID STIM HORMONE: CPT

## 2020-05-21 PROCEDURE — 85027 COMPLETE CBC AUTOMATED: CPT

## 2020-06-25 ENCOUNTER — TELEPHONE (OUTPATIENT)
Dept: FAMILY MEDICINE CLINIC | Age: 65
End: 2020-06-25

## 2020-08-10 RX ORDER — DEXLANSOPRAZOLE 60 MG/1
CAPSULE, DELAYED RELEASE ORAL
Qty: 90 CAPSULE | Refills: 0 | Status: SHIPPED | OUTPATIENT
Start: 2020-08-10 | End: 2020-11-09

## 2020-08-10 RX ORDER — MONTELUKAST SODIUM 10 MG/1
TABLET ORAL
Qty: 90 TABLET | Refills: 0 | Status: SHIPPED | OUTPATIENT
Start: 2020-08-10 | End: 2020-11-09

## 2020-08-10 NOTE — TELEPHONE ENCOUNTER
Last seen 5/18/2020  Last filled 5/18/2020 90 days with 0 RF    Next Visit Date:  Future Appointments   Date Time Provider Nicholas Johnson   8/18/2020 10:30 AM Blanco Willett MD [facility]   9/28/2020  2:00 PM Blanco Willett MD [facility] [address] Maintenance   Topic Date Due    Shingles Vaccine (1 of 2) 06/12/2004    DEXA (modify frequency per FRAX score)  06/12/2009    Annual Wellness Visit (AWV)  08/13/2020 (Originally 6/8/2019)    Flu vaccine (1) 09/01/2020    Colon cancer screen colonoscopy  03/30/2021    Breast cancer screen  05/21/2021    Potassium monitoring  05/21/2021    Creatinine monitoring  05/21/2021    Pneumococcal 65+ years Vaccine (2 of 2 - PPSV23) 10/09/2022    Lipid screen  05/21/2025    DTaP/Tdap/Td vaccine (2 - Td) 12/29/2027    Hepatitis C screen  Completed    Hepatitis A vaccine  Aged Out    Hepatitis B vaccine  Aged Out    Hib vaccine  Aged Out    Meningococcal (ACWY) vaccine  Aged Out       Hemoglobin A1C (%)   Date Value   05/21/2020 5.4   07/15/2019 5.4   07/09/2018 5.5             ( goal A1C is < 7)   No results found for: LABMICR  LDL Cholesterol (mg/dL)   Date Value   05/21/2020 90   07/15/2019 85       (goal LDL is <100)   AST (U/L)   Date Value   05/21/2020 23     ALT (U/L)   Date Value   05/21/2020 16     BUN (mg/dL)   Date Value   05/21/2020 15     BP Readings from Last 3 Encounters:   08/13/19 130/80   05/31/19 (!) 142/86   05/13/19 (!) 140/90          (goal 120/80)    All Future Testing planned in [facility]  Lab Frequency Next Occurrence               Patient Active Problem List:     Rheumatoid arthritis involving multiple sites ([facility] [address]     Osteoporosis     GERD (gastroesophageal reflux disease)     Hypertension     Iron deficiency anemia due to chronic blood loss     Rheumatoid aortitis     NICOLASA on CPAP     Hiatal hernia     On prednisone therapy     Long term methotrexate user     Hypoalbuminemia     Acquired scoliosis     Impingement syndrome

## 2020-09-01 ENCOUNTER — TELEPHONE (OUTPATIENT)
Dept: INTERNAL MEDICINE CLINIC | Age: 65
End: 2020-09-01

## 2020-09-04 ENCOUNTER — OFFICE VISIT (OUTPATIENT)
Dept: INTERNAL MEDICINE CLINIC | Age: 65
End: 2020-09-04
Payer: MEDICARE

## 2020-09-04 VITALS
TEMPERATURE: 96.8 F | SYSTOLIC BLOOD PRESSURE: 156 MMHG | HEIGHT: 63 IN | WEIGHT: 239 LBS | BODY MASS INDEX: 42.35 KG/M2 | HEART RATE: 86 BPM | DIASTOLIC BLOOD PRESSURE: 89 MMHG

## 2020-09-04 PROBLEM — Z79.891 CHRONIC USE OF OPIATE DRUG FOR THERAPEUTIC PURPOSE: Status: ACTIVE | Noted: 2020-09-04

## 2020-09-04 PROCEDURE — 1123F ACP DISCUSS/DSCN MKR DOCD: CPT | Performed by: FAMILY MEDICINE

## 2020-09-04 PROCEDURE — G8427 DOCREV CUR MEDS BY ELIG CLIN: HCPCS | Performed by: FAMILY MEDICINE

## 2020-09-04 PROCEDURE — 4040F PNEUMOC VAC/ADMIN/RCVD: CPT | Performed by: FAMILY MEDICINE

## 2020-09-04 PROCEDURE — 1090F PRES/ABSN URINE INCON ASSESS: CPT | Performed by: FAMILY MEDICINE

## 2020-09-04 PROCEDURE — 1036F TOBACCO NON-USER: CPT | Performed by: FAMILY MEDICINE

## 2020-09-04 PROCEDURE — G8400 PT W/DXA NO RESULTS DOC: HCPCS | Performed by: FAMILY MEDICINE

## 2020-09-04 PROCEDURE — G8417 CALC BMI ABV UP PARAM F/U: HCPCS | Performed by: FAMILY MEDICINE

## 2020-09-04 PROCEDURE — 99214 OFFICE O/P EST MOD 30 MIN: CPT | Performed by: FAMILY MEDICINE

## 2020-09-04 PROCEDURE — 3017F COLORECTAL CA SCREEN DOC REV: CPT | Performed by: FAMILY MEDICINE

## 2020-09-04 RX ORDER — DULOXETIN HYDROCHLORIDE 30 MG/1
30 CAPSULE, DELAYED RELEASE ORAL DAILY
Qty: 90 CAPSULE | Refills: 0 | Status: SHIPPED | OUTPATIENT
Start: 2020-09-04 | End: 2020-11-25

## 2020-09-04 ASSESSMENT — ENCOUNTER SYMPTOMS
RESPIRATORY NEGATIVE: 1
EYES NEGATIVE: 1
ALLERGIC/IMMUNOLOGIC NEGATIVE: 1
BACK PAIN: 1
GASTROINTESTINAL NEGATIVE: 1

## 2020-09-04 ASSESSMENT — PATIENT HEALTH QUESTIONNAIRE - PHQ9
1. LITTLE INTEREST OR PLEASURE IN DOING THINGS: 0
SUM OF ALL RESPONSES TO PHQ QUESTIONS 1-9: 0
SUM OF ALL RESPONSES TO PHQ QUESTIONS 1-9: 0
2. FEELING DOWN, DEPRESSED OR HOPELESS: 0
SUM OF ALL RESPONSES TO PHQ9 QUESTIONS 1 & 2: 0

## 2020-09-04 NOTE — PROGRESS NOTES
Subjective:      Patient ID: Sandip Echevarria is a 76 y.o. female. Back Pain   This is a chronic problem. The current episode started more than 1 month ago. The problem occurs constantly. The problem has been waxing and waning since onset. The pain is present in the lumbar spine. The quality of the pain is described as cramping. The pain radiates to the left knee. The pain is at a severity of 7/10. The pain is severe. The pain is the same all the time. The symptoms are aggravated by stress and position. Stiffness is present all day. Risk factors include sedentary lifestyle, poor posture, lack of exercise, obesity and menopause. Treatments tried: Opiate dependent, daily prednisone as provided by rheumatology. The treatment provided mild relief. Review of Systems   Constitutional: Negative. HENT: Negative. Eyes: Negative. Respiratory: Negative. Cardiovascular: Negative. Gastrointestinal: Negative. Endocrine: Negative. Musculoskeletal: Positive for arthralgias, back pain, gait problem and myalgias. Skin: Negative. Allergic/Immunologic: Negative. Hematological: Negative. Psychiatric/Behavioral: Positive for dysphoric mood. The patient is nervous/anxious. Past family and social history unremarkable. Diagnosis Orders   1. Rheumatoid arthritis involving multiple sites, unspecified rheumatoid factor presence [facility])  Chhaya Hampton MD, Pain Management, [place]   2. Age related osteoporosis, unspecified pathological fracture presence     3. Gastroesophageal reflux disease without esophagitis     4. Essential hypertension     5. Iron deficiency anemia due to chronic blood loss     6. Rheumatoid aortitis     7. NICOLASA on CPAP     8. Hiatal hernia     9. On prednisone therapy     10. Long term methotrexate user     11. Hypoalbuminemia     12. Acquired scoliosis     13.  Impingement syndrome of shoulder region, unspecified laterality  Anne Marie Mireles MD, Pain Management, [place]   14. Traumatic rupture of quadriceps tendon, unspecified laterality, sequela     15. Vasomotor rhinitis     16. Lumbar discogenic pain syndrome  Amira Park MD, Pain Management, [place]   17. Moderate persistent asthma without complication     18. Chronic use of opiate drug for therapeutic purpose           Objective:   Physical Exam  Vitals signs and nursing note reviewed. Constitutional:       Appearance: She is well-developed. Comments: Obesity   HENT:      Head: Normocephalic and atraumatic. Right Ear: External ear normal.      Left Ear: External ear normal.      Nose:      Comments: Allergies allergic rhinitis  Eyes:      Conjunctiva/sclera: Conjunctivae normal.      Pupils: Pupils are equal, round, and reactive to light. Neck:      Musculoskeletal: Normal range of motion and neck supple. Cardiovascular:      Rate and Rhythm: Normal rate and regular rhythm. Heart sounds: Normal heart sounds. Pulmonary:      Effort: Pulmonary effort is normal.      Breath sounds: Normal breath sounds. Comments: Moderate asthma  Abdominal:      General: Bowel sounds are normal.      Palpations: Abdomen is soft. Comments: GERD   Genitourinary:     Vagina: Normal.   Musculoskeletal: Normal range of motion. Comments: Advanced rheumatoid arthritis with respective deformities bilateral hands and wrist.  She is on Arava, methotrexate, daily prednisone and Ultracet and folic acid as provided by rheumatology   Skin:     General: Skin is warm and dry. Comments: Psoriasis   Neurological:      Mental Status: She is alert and oriented to person, place, and time. Deep Tendon Reflexes: Reflexes are normal and symmetric. Psychiatric:      Comments: Anxiety/depression  Chronic pain syndrome         Assessment:       Diagnosis Orders   1.  Rheumatoid arthritis involving multiple sites, unspecified rheumatoid factor presence [facility])  Amira Park MD, Pain Management, and vitamin D  Increase activity as tolerated  Med list and available labs reviewed, discussed with patient, questions answered  She is encouraged to call for any concern  This note is created with a voice recognition program and while intend to generate a document that accurately reflects the content of the visit, no guarantee can be provided that every mistake has been identified and corrected by editing.           Suman Butler MD

## 2020-10-21 ENCOUNTER — INITIAL CONSULT (OUTPATIENT)
Dept: PAIN MANAGEMENT | Age: 65
End: 2020-10-21
Payer: MEDICARE

## 2020-10-21 VITALS
SYSTOLIC BLOOD PRESSURE: 114 MMHG | HEIGHT: 63 IN | BODY MASS INDEX: 43.59 KG/M2 | HEART RATE: 93 BPM | DIASTOLIC BLOOD PRESSURE: 84 MMHG | WEIGHT: 246 LBS | TEMPERATURE: 97.1 F

## 2020-10-21 PROBLEM — G89.29 CHRONIC BILATERAL LOW BACK PAIN WITHOUT SCIATICA: Status: ACTIVE | Noted: 2020-10-21

## 2020-10-21 PROBLEM — E66.01 MORBID OBESITY WITH BMI OF 40.0-44.9, ADULT (HCC): Status: ACTIVE | Noted: 2020-10-21

## 2020-10-21 PROBLEM — M47.817 LUMBOSACRAL SPONDYLOSIS WITHOUT MYELOPATHY: Status: ACTIVE | Noted: 2020-10-21

## 2020-10-21 PROBLEM — M54.50 CHRONIC BILATERAL LOW BACK PAIN WITHOUT SCIATICA: Status: ACTIVE | Noted: 2020-10-21

## 2020-10-21 PROCEDURE — G8417 CALC BMI ABV UP PARAM F/U: HCPCS | Performed by: ANESTHESIOLOGY

## 2020-10-21 PROCEDURE — G8427 DOCREV CUR MEDS BY ELIG CLIN: HCPCS | Performed by: ANESTHESIOLOGY

## 2020-10-21 PROCEDURE — 99214 OFFICE O/P EST MOD 30 MIN: CPT | Performed by: ANESTHESIOLOGY

## 2020-10-21 PROCEDURE — 1090F PRES/ABSN URINE INCON ASSESS: CPT | Performed by: ANESTHESIOLOGY

## 2020-10-21 PROCEDURE — G8484 FLU IMMUNIZE NO ADMIN: HCPCS | Performed by: ANESTHESIOLOGY

## 2020-10-21 RX ORDER — GABAPENTIN 300 MG/1
CAPSULE ORAL
COMMUNITY
Start: 2020-10-01

## 2020-10-21 RX ORDER — PREDNISONE 20 MG/1
TABLET ORAL
COMMUNITY
Start: 2020-09-02

## 2020-10-21 RX ORDER — MELOXICAM 7.5 MG/1
TABLET ORAL
COMMUNITY
Start: 2020-10-10

## 2020-10-21 ASSESSMENT — ENCOUNTER SYMPTOMS
EYES NEGATIVE: 1
GASTROINTESTINAL NEGATIVE: 1
RESPIRATORY NEGATIVE: 1

## 2020-10-21 NOTE — PROGRESS NOTES
or toes (specify which finger or toes):No  -constant or intermitting: Intermitting   11. Red Flags: (weight loss / chills / loss of bladder or bowel control): No     Previous management history  1. Previous diagnostic workup: (Imaging/EMG)   CT, MRI, or Xray: Yes, Xray of HIP on 10/15/20  What part of the body: Left Hip Joint & Left Knee  What facility did they have it at:[facility] year or specific date: 10/15/2020  EMG:  no    2. Previous non interventional treatments tried:  chiropractor or physical therapy: Yes, Chiropractor   What part of the body: Back  What facility was it done at:  N/A  How long ago was it last tried:Years  Did it work: No  Did they complete it: Yes     3. Previous Medications tried  NSAID's: yes,   Neurontin: yes,   Lyrica: yes  Trycyclic antidepressant (Ellavil / Pamelor ): no  Cymbalta: no  Opioids (Ultram / Vicodin / Percocet / Morphine / Dilaudid / Oramorph/ Fentanyl etc.):yes  Last Pain medication taken (name of med and date):    4. Previous Interventional pain procedures tried:  What kind of injection:Yes, in the left knee   Who did the injection: Emily Gar at [facility]  did the injection help: no  Last time injection was done:08/13/2020    5. Previous surgeries for pain  What part of the body did they have the surgery: Left Hip  What physician did the surgery: [facility] did they have the surgery done: [place]  Date of Surgery: 1997    Social History:  Marital status:   Employment History: No  Working  No  Full time Or Part time: No  Disability  Yes   Legal Issues related to pain complaint: No     Pain Disability Index score : 73     Lab Results   Component Value Date    LABA1C 5.4 05/21/2020     Lab Results   Component Value Date     05/21/2020     Informant: Patient      Past Medical History:   Diagnosis Date    Asthma     CHF (congestive heart failure) ([facility] [address] 2006?     [facility]    Depression     GERD (gastroesophageal reflux disease)     H/O scarlet fever     Hiatal hernia     Hypertension     Obesity     Osteoarthritis     Rheumatoid arthritis(714.0)     hands, feet, hips, knees    Sleep apnea     Thyroid disease     Ulcer       Past Surgical History:   Procedure Laterality Date    CARPAL TUNNEL RELEASE      left wrist    COLONOSCOPY  2016    three sessile polyps removed; internal hemorrhoids    EYE SURGERY Right     fluid extraction    FOOT SURGERY      HIP SURGERY      left hip    JOINT REPLACEMENT  ? right total knee    KNEE ARTHROPLASTY Right 2015    revision of arthroplasty    KNEE SURGERY  2005    right knee    OTHER SURGICAL HISTORY Right 2015    right leg quad tendon repair.  MI ESOPHAGOGASTRODUODENOSCOPY TRANSORAL DIAGNOSTIC N/A 11/15/2017    EGD ESOPHAGOGASTRODUODENOSCOPY performed by Cecy Brar MD at [facility]  11/15/2017    Esophagitis- LA Class A; moderate sliding hiatal hernia     Social History     Socioeconomic History    Marital status: Single     Spouse name: None    Number of children: 1    Years of education: None    Highest education level: None   Occupational History    None   Social Needs    Financial resource strain: None    Food insecurity     Worry: None     Inability: None    Transportation needs     Medical: None     Non-medical: None   Tobacco Use    Smoking status: Former Smoker     Packs/day: 0.50     Years: 30.00     Pack years: 15.00     Last attempt to quit: 2016     Years since quittin.2    Smokeless tobacco: Never Used    Tobacco comment: couple cigs per day   Substance and Sexual Activity    Alcohol use:  Yes     Alcohol/week: 2.0 standard drinks     Types: 2 Shots of liquor per week     Comment: weekends    Drug use: No    Sexual activity: None   Lifestyle    Physical activity     Days per week: None     Minutes per session: None    Stress: None   Relationships    Social connections     Talks on phone: None     Gets together: None     Attends Christianity service: None     Active member of club or organization: None     Attends meetings of clubs or organizations: None     Relationship status: None    Intimate partner violence     Fear of current or ex partner: None     Emotionally abused: None     Physically abused: None     Forced sexual activity: None   Other Topics Concern    None   Social History Narrative    None     Family History   Problem Relation Age of Onset    Breast Cancer Maternal Grandmother     Diabetes Father     Cancer Mother         bone    Hypertension Brother     Diabetes Brother      Allergies   Allergen Reactions    Codeine Itching     Percocet is ok    Tape [Adhesive Tape] Other (See Comments)     (clear adhesive tape) skin tears     Codeine and Tape [adhesive tape]   Vitals:    10/21/20 1127   BP: 114/84   Pulse: 93   Temp: 97.1 °F (36.2 °C)     Current Outpatient Medications   Medication Sig Dispense Refill    DULoxetine (CYMBALTA) 30 MG extended release capsule Take 1 capsule by mouth daily 90 capsule 0    montelukast (SINGULAIR) 10 MG tablet take 1 tablet by mouth every evening 90 tablet 0    dexlansoprazole (DEXILANT) 60 MG CPDR delayed release capsule take 1 capsule by mouth once daily 90 capsule 0    Multiple Vitamins-Minerals (THERAPEUTIC MULTIVITAMIN-MINERALS) tablet Take 1 tablet by mouth daily      predniSONE (DELTASONE) 5 MG tablet take 1 tablet by mouth BID  0    albuterol (PROVENTIL) (2.5 MG/3ML) 0.083% nebulizer solution Take 3 mLs by nebulization every 6 hours as needed for Wheezing 567 each 3    folic acid (FOLVITE) 1 MG tablet Take 1 mg by mouth daily   1    methotrexate (RHEUMATREX) 2.5 MG chemo tablet Take 15 mg by mouth once a week Indications: Takes 6 tabs (=15mg) on Wednesdays   0    budesonide-formoterol (SYMBICORT) 160-4.5 MCG/ACT AERO Inhale 2 puffs into the lungs 2 times daily 1 Inhaler 1    azelastine (ASTELIN) 0.1 % nasal spray 2 sprays by Nasal route 2 times daily Use in each nostril as directed 1 Bottle 3    traMADol-acetaminophen (ULTRACET) 37.5-325 MG TABS Take 1-2 tablets by mouth 3 times daily as needed for Pain       leflunomide (ARAVA) 20 MG tablet Take 1 tablet by mouth daily. 30 tablet 3    alendronate (FOSAMAX) 70 MG tablet take 1 tablet by mouth every week (Patient taking differently: take 1 tablet by mouth every week  **Friday**) 4 tablet 3    albuterol (PROVENTIL HFA;VENTOLIN HFA) 108 (90 BASE) MCG/ACT inhaler Inhale 2 puffs into the lungs every 6 hours as needed. (Patient taking differently: Inhale 2 puffs into the lungs every 6 hours as needed for Wheezing ) 1 Inhaler 3    gabapentin (NEURONTIN) 300 MG capsule take 1 capsule by mouth three times a day      meloxicam (MOBIC) 7.5 MG tablet take 1 tablet by mouth once daily with food or milk      predniSONE (DELTASONE) 20 MG tablet take 1 tablet by mouth once daily for 3 days       No current facility-administered medications for this visit. Review of Systems   Constitutional: Negative. Negative for fever. HENT: Negative. Eyes: Negative. Respiratory: Negative. Cardiovascular: Negative. Gastrointestinal: Negative. Endocrine: Negative. Genitourinary: Negative. Musculoskeletal: Positive for joint swelling. Neurological: Negative. Hematological: Negative. Psychiatric/Behavioral: Negative. Objective:  General Appearance:  Well-appearing, in no acute distress, uncomfortable and in pain. Vital signs: (most recent): Blood pressure 114/84, pulse 93, temperature 97.1 °F (36.2 °C), temperature source Temporal, height 5' 3\" (1.6 m), weight 246 lb (111.6 kg), not currently breastfeeding. Vital signs are normal.  No fever. Output: Producing urine and producing stool. HEENT: Normal HEENT exam.    Lungs:  Normal effort and normal respiratory rate. Breath sounds clear to auscultation. She is not in respiratory distress. Heart: Normal rate. Regular rhythm. Abdomen: Abdomen is soft. There is no abdominal tenderness. Extremities: Normal range of motion. There is no deformity. Neurological: Patient is alert and oriented to person, place and time. Normal strength. Patient has normal reflexes, normal muscle tone and normal coordination. Pupils:  Pupils are equal, round, and reactive to light. Pupils are equal.   Skin:  Warm and dry. No rash or cyanosis.    Lumbar spine examination  No apparent deformity on inspection  Range of motion is limited and associated with pain  Gait is stable but slow  Straight leg raise positive on left side  Positive tenderness to palpation over bilateral lower lumbar paraspinal muscle and facet joint  Facet loading maneuver positive    Neurological examination  Normal muscle mass, normal muscle tone, muscle strength 5/5 in both lower extremities in all major muscle group  Deep tendon reflexes are 2+ and symmetric    Assessment & Plan   Pain History  79-year-old woman with history of morbid obesity, rheumatoid arthritis involving multiple joints, on chronic opioid therapy tramadol  On chronic prednisone, on long-term methotrexate therapy  History of obstructive sleep apnea    She is seen in our clinic for history of chronic back pain  Back pain is located in the lumbar area across midline  Onset of symptom many years ago  Pain radiates down left leg over hip and left knee  Pain is constant describes it as sharp and tender sensation  Aggravated with any activity particularly standing and walking  She relates onset to a fall several months ago  Nothing seems to alleviate the pain  No changes in bladder or bowel control  Rates the pain intensity is 10 out of 10    Patient have a recent MRI lumbar spine available in epic  She has completed physical therapy without any improvement  Currently taking NSAID Mobic  Pain score today 10    Physical Therapy Daily Treatment Note     Date:  2019  Patient Name:  Nic Gore                  :  1955                     MRN: [id number]  Physician: Carol Marino - pain management                                     Insurance: [employer]   Diagnosis: Lumbar radiculopathy    Onset Date: 2018                          Next Dr. Morfin Narrow: ?  Visit# / total visits:                     Cancels/No Shows: 3/0    EXAMINATION: MRI OF THE LUMBAR SPINE WITHOUT CONTRAST, 2019 12:03 pm    Impression Multilevel degenerative disc disease with associated multilevel degenerative facet and ligamentous hypertrophy without significant canal stenosis. Moderate to severe left foraminal narrowing at L3-4, moderate left and moderate to severe right foraminal narrowing at L4-5, and mild left and moderate right foraminal narrowing at L5-S1.           1. Chronic bilateral low back pain with left-sided sciatica    2. Chronic use of opiate drug for therapeutic purpose    3. NICOLASA on CPAP    4. On prednisone therapy    5. Rheumatoid arthritis involving multiple sites, unspecified whether rheumatoid factor present ([address]    6. Lumbosacral spondylosis without myelopathy    7.  Morbid obesity with BMI of 40.0-44.9, adult ([address]        MRI lumbar spine  Report was reviewed  Images reviewed independently  Pertinent finding discussed with patient  Left-sided disc bulge with severe left-sided foraminal narrowing at L3-L4 is likely responsible for her left lower extremity pain  Chronic axial back pain is likely resulting from lumbar facet arthropathy and facet effusion which is probably being exacerbated by her rheumatoid arthritis history    She has tried conservative measures with physical therapy and NSAIDs  I would recommend for left-sided lumbar epidural steroid injection and bilateral lumbar facet injection      Orders Placed This Encounter   Procedures    FACET JOINT L/S SINGLE    NH INJECT ANES/STEROID FORAMEN LUMBAR/SACRAL W IMG GUIDE ,1 LEVEL      No orders of the defined types were placed in this encounter.        Consultation note sent to the referring physician  Electronically signed by Linda Cruz MD on 10/21/2020 at 12:12 PM

## 2020-11-09 RX ORDER — DEXLANSOPRAZOLE 60 MG/1
CAPSULE, DELAYED RELEASE ORAL
Qty: 30 CAPSULE | Refills: 0 | Status: SHIPPED | OUTPATIENT
Start: 2020-11-09 | End: 2020-12-18 | Stop reason: SDUPTHER

## 2020-11-09 RX ORDER — MONTELUKAST SODIUM 10 MG/1
TABLET ORAL
Qty: 30 TABLET | Refills: 0 | Status: SHIPPED | OUTPATIENT
Start: 2020-11-09 | End: 2020-12-18 | Stop reason: SDUPTHER

## 2020-11-09 NOTE — TELEPHONE ENCOUNTER
William Hanna is calling to request a refill on the following medication(s):    Medication Request:    Last filled 8/10/2020 #90 with 0 RF  Pended 30 days with note- pt is due for appt    Requested Prescriptions     Pending Prescriptions Disp Refills    montelukast (SINGULAIR) 10 MG tablet [Pharmacy Med Name: MONTELUKAST SOD 10 MG TABLET] 90 tablet 0     Sig: take 1 tablet by mouth every evening    dexlansoprazole (DEXILANT) 60 MG CPDR delayed release capsule [Pharmacy Med Name: DEXILANT DR 60 MG CAPSULE] 90 capsule 0     Sig: take 1 capsule by mouth once daily       Last Visit Date (If Applicable):  1/9/8992    Next Visit Date:    Visit date not found

## 2020-11-13 ENCOUNTER — TELEPHONE (OUTPATIENT)
Dept: PAIN MANAGEMENT | Age: 65
End: 2020-11-13

## 2020-11-13 NOTE — TELEPHONE ENCOUNTER
Patient called in to cancel procedures on 11/16 & 11/30 due to finances. Please contact the patient at [phone] to cancel. Thank you.

## 2020-11-16 NOTE — TELEPHONE ENCOUNTER
Spoke with patient and confirmed she would like to cancel both procedures scheduled for 11/16/20 and 11/30/20.  Pt will call office when ready to rescheduled

## 2020-11-25 RX ORDER — DULOXETIN HYDROCHLORIDE 30 MG/1
CAPSULE, DELAYED RELEASE ORAL
Qty: 30 CAPSULE | Refills: 0 | Status: SHIPPED | OUTPATIENT
Start: 2020-11-25 | End: 2020-12-18 | Stop reason: SDUPTHER

## 2020-11-25 NOTE — TELEPHONE ENCOUNTER
Soniya Narayanan is calling to request a refill on the following medication(s):    Medication Request:    Last filled 9/4/2020 #90 with 0 Rf  Pended 30 days with note-pt is due for appt    Requested Prescriptions     Pending Prescriptions Disp Refills    DULoxetine (CYMBALTA) 30 MG extended release capsule [Pharmacy Med Name: DULOXETINE HCL DR 30 MG CAP] 90 capsule 0     Sig: take 1 capsule by mouth once daily       Last Visit Date (If Applicable):  9/9/2851    Next Visit Date:    Visit date not found

## 2020-12-09 RX ORDER — MONTELUKAST SODIUM 10 MG/1
TABLET ORAL
Qty: 30 TABLET | Refills: 0 | OUTPATIENT
Start: 2020-12-09

## 2020-12-09 RX ORDER — DEXLANSOPRAZOLE 60 MG/1
CAPSULE, DELAYED RELEASE ORAL
Qty: 30 CAPSULE | Refills: 0 | OUTPATIENT
Start: 2020-12-09

## 2020-12-18 ENCOUNTER — OFFICE VISIT (OUTPATIENT)
Dept: INTERNAL MEDICINE CLINIC | Age: 65
End: 2020-12-18
Payer: MEDICARE

## 2020-12-18 VITALS
HEIGHT: 63 IN | SYSTOLIC BLOOD PRESSURE: 132 MMHG | TEMPERATURE: 97.1 F | DIASTOLIC BLOOD PRESSURE: 80 MMHG | BODY MASS INDEX: 43.41 KG/M2 | HEART RATE: 88 BPM | WEIGHT: 245 LBS | RESPIRATION RATE: 24 BRPM

## 2020-12-18 PROBLEM — M47.817 LUMBOSACRAL SPONDYLOSIS WITHOUT MYELOPATHY: Status: RESOLVED | Noted: 2020-10-21 | Resolved: 2020-12-18

## 2020-12-18 PROCEDURE — 1123F ACP DISCUSS/DSCN MKR DOCD: CPT | Performed by: FAMILY MEDICINE

## 2020-12-18 PROCEDURE — G8400 PT W/DXA NO RESULTS DOC: HCPCS | Performed by: FAMILY MEDICINE

## 2020-12-18 PROCEDURE — G8484 FLU IMMUNIZE NO ADMIN: HCPCS | Performed by: FAMILY MEDICINE

## 2020-12-18 PROCEDURE — 1036F TOBACCO NON-USER: CPT | Performed by: FAMILY MEDICINE

## 2020-12-18 PROCEDURE — 1090F PRES/ABSN URINE INCON ASSESS: CPT | Performed by: FAMILY MEDICINE

## 2020-12-18 PROCEDURE — 4040F PNEUMOC VAC/ADMIN/RCVD: CPT | Performed by: FAMILY MEDICINE

## 2020-12-18 PROCEDURE — 3017F COLORECTAL CA SCREEN DOC REV: CPT | Performed by: FAMILY MEDICINE

## 2020-12-18 PROCEDURE — 99214 OFFICE O/P EST MOD 30 MIN: CPT | Performed by: FAMILY MEDICINE

## 2020-12-18 PROCEDURE — G8417 CALC BMI ABV UP PARAM F/U: HCPCS | Performed by: FAMILY MEDICINE

## 2020-12-18 PROCEDURE — G8427 DOCREV CUR MEDS BY ELIG CLIN: HCPCS | Performed by: FAMILY MEDICINE

## 2020-12-18 RX ORDER — DULOXETIN HYDROCHLORIDE 30 MG/1
CAPSULE, DELAYED RELEASE ORAL
Qty: 90 CAPSULE | Refills: 1 | Status: SHIPPED | OUTPATIENT
Start: 2020-12-18 | End: 2021-06-11

## 2020-12-18 RX ORDER — MONTELUKAST SODIUM 10 MG/1
TABLET ORAL
Qty: 90 TABLET | Refills: 1 | Status: SHIPPED | OUTPATIENT
Start: 2020-12-18

## 2020-12-18 RX ORDER — DEXLANSOPRAZOLE 60 MG/1
CAPSULE, DELAYED RELEASE ORAL
Qty: 90 CAPSULE | Refills: 1 | Status: SHIPPED | OUTPATIENT
Start: 2020-12-18

## 2020-12-18 ASSESSMENT — ENCOUNTER SYMPTOMS
GASTROINTESTINAL NEGATIVE: 1
ALLERGIC/IMMUNOLOGIC NEGATIVE: 1
SHORTNESS OF BREATH: 1
BACK PAIN: 1
EYES NEGATIVE: 1

## 2020-12-18 NOTE — PROGRESS NOTES
Subjective:      Patient ID: Felicia Zapata is a 76 y.o. female. Asthma  She complains of shortness of breath. The current episode started more than 1 month ago. The problem occurs intermittently. The problem has been waxing and waning. Associated symptoms include malaise/fatigue and myalgias. Her symptoms are aggravated by emotional stress, change in weather and pollen. Her symptoms are alleviated by anxiolytics, beta-agonist and steroid inhaler. She reports moderate improvement on treatment. Her past medical history is significant for asthma and COPD. Review of Systems   Constitutional: Positive for malaise/fatigue. HENT: Negative. Eyes: Negative. Respiratory: Positive for shortness of breath. Cardiovascular: Negative. Gastrointestinal: Negative. Endocrine: Negative. Musculoskeletal: Positive for arthralgias, back pain, gait problem, myalgias and neck pain. Skin: Negative. Allergic/Immunologic: Negative. Hematological: Negative. Psychiatric/Behavioral: Positive for dysphoric mood. The patient is nervous/anxious. Past family and social history unremarkable. Diagnosis Orders   1. Rheumatoid arthritis involving multiple sites, unspecified whether rheumatoid factor present ([facility] [address]     2. Age-related osteoporosis without current pathological fracture     3. Gastroesophageal reflux disease without esophagitis     4. Essential hypertension     5. Iron deficiency anemia due to chronic blood loss     6. Rheumatoid aortitis     7. NICOLASA on CPAP     8. Hiatal hernia     9. On prednisone therapy     10. Long term methotrexate user     11. Hypoalbuminemia     12. Acquired scoliosis     13. Impingement syndrome of shoulder region, unspecified laterality     14. Traumatic rupture of quadriceps tendon, unspecified laterality, sequela     15. Non-seasonal allergic rhinitis due to pollen     16. Lumbar discogenic pain syndrome     17. Moderate persistent asthma without complication     18. visit, no guarantee can be provided that every mistake has been identified and corrected by editing.        Philip Perdue MD

## 2021-01-11 ENCOUNTER — HOSPITAL ENCOUNTER (OUTPATIENT)
Dept: LAB | Age: 66
Setting detail: SPECIMEN
Discharge: HOME | End: 2021-01-11
Payer: MEDICARE

## 2021-01-11 ENCOUNTER — OFFICE VISIT (OUTPATIENT)
Dept: INTERNAL MEDICINE CLINIC | Age: 66
End: 2021-01-11
Payer: MEDICARE

## 2021-01-11 VITALS
SYSTOLIC BLOOD PRESSURE: 138 MMHG | BODY MASS INDEX: 42.88 KG/M2 | TEMPERATURE: 97.1 F | HEART RATE: 84 BPM | DIASTOLIC BLOOD PRESSURE: 82 MMHG | WEIGHT: 242 LBS | HEIGHT: 63 IN | RESPIRATION RATE: 24 BRPM

## 2021-01-11 DIAGNOSIS — S76.112S: ICD-10-CM

## 2021-01-11 DIAGNOSIS — Z01.818 PREOPERATIVE CLEARANCE: Primary | ICD-10-CM

## 2021-01-11 DIAGNOSIS — I10 ESSENTIAL HYPERTENSION: ICD-10-CM

## 2021-01-11 DIAGNOSIS — M81.0 AGE-RELATED OSTEOPOROSIS WITHOUT CURRENT PATHOLOGICAL FRACTURE: ICD-10-CM

## 2021-01-11 DIAGNOSIS — Z79.891 CHRONIC USE OF OPIATE DRUG FOR THERAPEUTIC PURPOSE: ICD-10-CM

## 2021-01-11 DIAGNOSIS — E66.01 MORBID OBESITY WITH BMI OF 40.0-44.9, ADULT (HCC): ICD-10-CM

## 2021-01-11 DIAGNOSIS — G47.33 OSA ON CPAP: ICD-10-CM

## 2021-01-11 DIAGNOSIS — J45.40 MODERATE PERSISTENT ASTHMA WITHOUT COMPLICATION: ICD-10-CM

## 2021-01-11 DIAGNOSIS — K44.9 HIATAL HERNIA: ICD-10-CM

## 2021-01-11 DIAGNOSIS — J30.0 VASOMOTOR RHINITIS: ICD-10-CM

## 2021-01-11 DIAGNOSIS — M54.50 CHRONIC BILATERAL LOW BACK PAIN WITHOUT SCIATICA: ICD-10-CM

## 2021-01-11 DIAGNOSIS — M06.9 RHEUMATOID ARTHRITIS INVOLVING MULTIPLE SITES, UNSPECIFIED WHETHER RHEUMATOID FACTOR PRESENT (HCC): ICD-10-CM

## 2021-01-11 DIAGNOSIS — K21.9 GASTROESOPHAGEAL REFLUX DISEASE WITHOUT ESOPHAGITIS: ICD-10-CM

## 2021-01-11 DIAGNOSIS — M41.9 ACQUIRED SCOLIOSIS: ICD-10-CM

## 2021-01-11 DIAGNOSIS — G89.29 CHRONIC BILATERAL LOW BACK PAIN WITHOUT SCIATICA: ICD-10-CM

## 2021-01-11 DIAGNOSIS — M51.26 LUMBAR DISCOGENIC PAIN SYNDROME: ICD-10-CM

## 2021-01-11 DIAGNOSIS — Z79.631 LONG TERM METHOTREXATE USER: ICD-10-CM

## 2021-01-11 DIAGNOSIS — M75.40 IMPINGEMENT SYNDROME OF SHOULDER REGION, UNSPECIFIED LATERALITY: ICD-10-CM

## 2021-01-11 DIAGNOSIS — D50.0 IRON DEFICIENCY ANEMIA DUE TO CHRONIC BLOOD LOSS: ICD-10-CM

## 2021-01-11 DIAGNOSIS — Z99.89 OSA ON CPAP: ICD-10-CM

## 2021-01-11 DIAGNOSIS — E88.09 HYPOALBUMINEMIA: ICD-10-CM

## 2021-01-11 DIAGNOSIS — Z79.52 ON PREDNISONE THERAPY: ICD-10-CM

## 2021-01-11 DIAGNOSIS — I01.1 RHEUMATOID AORTITIS: ICD-10-CM

## 2021-01-11 DIAGNOSIS — Z01.818 PREOP TESTING: Primary | ICD-10-CM

## 2021-01-11 PROCEDURE — U0003 INFECTIOUS AGENT DETECTION BY NUCLEIC ACID (DNA OR RNA); SEVERE ACUTE RESPIRATORY SYNDROME CORONAVIRUS 2 (SARS-COV-2) (CORONAVIRUS DISEASE [COVID-19]), AMPLIFIED PROBE TECHNIQUE, MAKING USE OF HIGH THROUGHPUT TECHNOLOGIES AS DESCRIBED BY CMS-2020-01-R: HCPCS

## 2021-01-11 PROCEDURE — U0005 INFEC AGEN DETEC AMPLI PROBE: HCPCS

## 2021-01-11 PROCEDURE — 99214 OFFICE O/P EST MOD 30 MIN: CPT | Performed by: FAMILY MEDICINE

## 2021-01-11 ASSESSMENT — PATIENT HEALTH QUESTIONNAIRE - PHQ9
2. FEELING DOWN, DEPRESSED OR HOPELESS: 0
1. LITTLE INTEREST OR PLEASURE IN DOING THINGS: 0
SUM OF ALL RESPONSES TO PHQ QUESTIONS 1-9: 0
SUM OF ALL RESPONSES TO PHQ9 QUESTIONS 1 & 2: 0

## 2021-01-11 ASSESSMENT — ENCOUNTER SYMPTOMS
EYES NEGATIVE: 1
GASTROINTESTINAL NEGATIVE: 1
BACK PAIN: 1
COUGH: 1
ALLERGIC/IMMUNOLOGIC NEGATIVE: 1

## 2021-01-11 NOTE — PROGRESS NOTES
Subjective:      Patient ID: Sara Patino is a 76 y.o. female. Asthma  She complains of cough. This is a chronic problem. The current episode started more than 1 month ago. The problem occurs intermittently. The problem has been waxing and waning. The cough is non-productive. Associated symptoms include myalgias. Her symptoms are aggravated by change in weather and pollen. Her symptoms are alleviated by beta-agonist, steroid inhaler and oral steroids. She reports moderate improvement on treatment. Her past medical history is significant for asthma and COPD. Review of Systems   Constitutional: Negative. HENT: Negative. Eyes: Negative. Respiratory: Positive for cough. Cardiovascular: Negative. Gastrointestinal: Negative. Endocrine: Negative. Musculoskeletal: Positive for arthralgias, back pain and myalgias. Skin: Negative. Allergic/Immunologic: Negative. Neurological: Negative. Hematological: Negative. Psychiatric/Behavioral: The patient is nervous/anxious. Past family and social history unremarkable. Diagnosis Orders   1. Preoperative clearance     2. Rheumatoid arthritis involving multiple sites, unspecified whether rheumatoid factor present ([facility] [address]     3. Age-related osteoporosis without current pathological fracture     4. Gastroesophageal reflux disease without esophagitis     5. Essential hypertension     6. Iron deficiency anemia due to chronic blood loss     7. Rheumatoid aortitis     8. NICOLASA on CPAP     9. Hiatal hernia     10. On prednisone therapy     11. Long term methotrexate user     12. Hypoalbuminemia     13. Acquired scoliosis     14. Impingement syndrome of shoulder region, unspecified laterality     15. Traumatic rupture of left quadriceps tendon, sequela     16. Vasomotor rhinitis     17. Lumbar discogenic pain syndrome     18. Moderate persistent asthma without complication     19. Chronic use of opiate drug for therapeutic purpose     20.  Chronic bilateral low back pain without sciatica     21. Morbid obesity with BMI of 40.0-44.9, adult ([facility] [address]           Objective:   Physical Exam  Vitals signs and nursing note reviewed. Constitutional:       Appearance: She is well-developed. Comments: Morbid obesity with sleep apnea on positive pressure ventilation   HENT:      Head: Normocephalic and atraumatic. Right Ear: External ear normal.      Left Ear: External ear normal.      Nose:      Comments: Allergies allergic rhinitis  Eyes:      Conjunctiva/sclera: Conjunctivae normal.      Pupils: Pupils are equal, round, and reactive to light. Neck:      Musculoskeletal: Normal range of motion and neck supple. Cardiovascular:      Rate and Rhythm: Normal rate and regular rhythm. Heart sounds: Normal heart sounds. Pulmonary:      Effort: Pulmonary effort is normal.      Breath sounds: Normal breath sounds. Comments: Moderate asthmanon-smoker  Abdominal:      General: Bowel sounds are normal.      Palpations: Abdomen is soft. Genitourinary:     Vagina: Normal.   Musculoskeletal: Normal range of motion. Comments: Rheumatoid arthritis  Osteoarthritis   Skin:     General: Skin is warm and dry. Neurological:      Mental Status: She is alert and oriented to person, place, and time. Deep Tendon Reflexes: Reflexes are normal and symmetric. Psychiatric:         Behavior: Behavior normal.         Thought Content: Thought content normal.         Judgment: Judgment normal.         Assessment:       Diagnosis Orders   1. Preoperative clearance     2. Rheumatoid arthritis involving multiple sites, unspecified whether rheumatoid factor present ([facility] [address]     3. Age-related osteoporosis without current pathological fracture     4. Gastroesophageal reflux disease without esophagitis     5. Essential hypertension     6. Iron deficiency anemia due to chronic blood loss     7. Rheumatoid aortitis     8. NICOLASA on CPAP     9. Hiatal hernia     10.  On prednisone therapy     11. Long term methotrexate user     12. Hypoalbuminemia     13. Acquired scoliosis     14. Impingement syndrome of shoulder region, unspecified laterality     15. Traumatic rupture of left quadriceps tendon, sequela     16. Vasomotor rhinitis     17. Lumbar discogenic pain syndrome     18. Moderate persistent asthma without complication     19. Chronic use of opiate drug for therapeutic purpose     20. Chronic bilateral low back pain without sciatica     21. Morbid obesity with BMI of 40.0-44.9, adult [facility])             Plan:      69-year-old morbidly obese female is presented requesting preop clearance. She is scheduled for balloon kyphoplasty. She is afebrile hemodynamically stable. Patient stated that she recently had a PAT done pending reporting  Morbid obesity with sleep apnea on CPAP. Asthmanon-smoker. She is established with pulmonary critical care home she is going to see today. She is on beta agonist inhaled corticosteroid and daily steroid p.o. Rheumatoid arthritis on Arava, methotrexate, folic acid and daily prednisone adjusted by rheumatologist.  She is complaining of significant back pain with underlying history of spinal stenosis positive on the CT scan recently done. GERD stable on Dexilant  Allergies allergic rhinitis. She is on Singulair with positive response  Known history of chronic pain syndrome on gabapentin teen  Morbid obesity which she would benefit from low-fat high-fiber diet, daily moderate exercise, lifestyle change and significant weight reduction. She is advised to consider bariatric consultation  Osteoporosis on bisphosphonate, calcium and vitamin D  She denies tobacco, excessive alcohol or illicit drug use  Med list and available labs reviewed, discussed with patient, questions answered  Okay to proceed to the schedule balloon kyphoplasty if she is cleared by pulmonology.   I recommend that she get preop stress dose steroid by anesthesia in view of history of chronic steroid use  This note is created with a voice recognition program and while intend to generate a document that accurately reflects the content of the visit, no guarantee can be provided that every mistake has been identified and corrected by editing.           Campbell Tucker MD

## 2021-01-12 LAB
SARS-COV-2, RAPID: NORMAL
SARS-COV-2: NORMAL
SARS-COV-2: NOT DETECTED
SOURCE: NORMAL

## 2021-01-13 ENCOUNTER — TELEPHONE (OUTPATIENT)
Dept: PRIMARY CARE CLINIC | Age: 66
End: 2021-01-13

## 2021-01-13 ENCOUNTER — HOSPITAL ENCOUNTER (OUTPATIENT)
Dept: PREADMISSION TESTING | Age: 66
Setting detail: SPECIMEN
Discharge: HOME | End: 2021-01-17
Payer: MEDICARE

## 2021-06-11 RX ORDER — DULOXETIN HYDROCHLORIDE 30 MG/1
CAPSULE, DELAYED RELEASE ORAL
Qty: 30 CAPSULE | Refills: 0 | Status: SHIPPED | OUTPATIENT
Start: 2021-06-11

## 2021-06-11 NOTE — TELEPHONE ENCOUNTER
Carroll Aviles is calling to request a refill on the following medication(s):    Medication Request:  Requested Prescriptions     Pending Prescriptions Disp Refills    DULoxetine (CYMBALTA) 30 MG extended release capsule [Pharmacy Med Name: DULOXETINE HCL DR 30 MG CAP] 90 capsule 1     Sig: take 1 capsule by mouth once daily     Last filled 12/18/20 90 day 1 refill  Order sent 30 day no refill, note for betzaida    Last Visit Date (If Applicable):  5/78/2372    Next Visit Date:    Visit date not found

## 2021-09-08 ENCOUNTER — TELEPHONE (OUTPATIENT)
Dept: FAMILY MEDICINE CLINIC | Age: 66
End: 2021-09-08

## 2023-01-12 ENCOUNTER — APPOINTMENT (OUTPATIENT)
Dept: GENERAL RADIOLOGY | Age: 68
End: 2023-01-12
Payer: COMMERCIAL

## 2023-01-12 ENCOUNTER — HOSPITAL ENCOUNTER (EMERGENCY)
Age: 68
Discharge: HOME | End: 2023-01-12
Attending: EMERGENCY MEDICINE
Payer: COMMERCIAL

## 2023-01-12 VITALS
BODY MASS INDEX: 39.87 KG/M2 | TEMPERATURE: 98.3 F | WEIGHT: 225 LBS | RESPIRATION RATE: 18 BRPM | HEART RATE: 106 BPM | OXYGEN SATURATION: 97 % | SYSTOLIC BLOOD PRESSURE: 135 MMHG | HEIGHT: 63 IN | DIASTOLIC BLOOD PRESSURE: 60 MMHG

## 2023-01-12 DIAGNOSIS — M25.561 RIGHT KNEE PAIN, UNSPECIFIED CHRONICITY: Primary | ICD-10-CM

## 2023-01-12 PROCEDURE — 6370000000 HC RX 637 (ALT 250 FOR IP): Performed by: EMERGENCY MEDICINE

## 2023-01-12 PROCEDURE — 99283 EMERGENCY DEPT VISIT LOW MDM: CPT

## 2023-01-12 PROCEDURE — 73502 X-RAY EXAM HIP UNI 2-3 VIEWS: CPT

## 2023-01-12 PROCEDURE — 73562 X-RAY EXAM OF KNEE 3: CPT

## 2023-01-12 RX ORDER — HYDROCODONE BITARTRATE AND ACETAMINOPHEN 5; 325 MG/1; MG/1
1 TABLET ORAL ONCE
Status: COMPLETED | OUTPATIENT
Start: 2023-01-12 | End: 2023-01-12

## 2023-01-12 RX ORDER — AMLODIPINE BESYLATE 10 MG/1
TABLET ORAL
COMMUNITY
Start: 2022-12-12

## 2023-01-12 RX ORDER — GABAPENTIN 100 MG/1
CAPSULE ORAL
COMMUNITY
Start: 2023-01-04

## 2023-01-12 RX ORDER — AMITRIPTYLINE HYDROCHLORIDE 10 MG/1
TABLET, FILM COATED ORAL
COMMUNITY
Start: 2022-11-29

## 2023-01-12 RX ORDER — HYDROCODONE BITARTRATE AND ACETAMINOPHEN 5; 325 MG/1; MG/1
1 TABLET ORAL EVERY 6 HOURS PRN
Qty: 12 TABLET | Refills: 0 | Status: SHIPPED | OUTPATIENT
Start: 2023-01-12 | End: 2023-01-15

## 2023-01-12 RX ORDER — ASCORBIC ACID 500 MG
500 TABLET ORAL
COMMUNITY

## 2023-01-12 RX ORDER — ASPIRIN 325 MG
325 TABLET ORAL
COMMUNITY

## 2023-01-12 RX ADMIN — HYDROCODONE BITARTRATE AND ACETAMINOPHEN 1 TABLET: 5; 325 TABLET ORAL at 22:25

## 2023-01-12 ASSESSMENT — ENCOUNTER SYMPTOMS
EYE REDNESS: 0
COUGH: 0
DIARRHEA: 0
FACIAL SWELLING: 0
COLOR CHANGE: 0
EYE DISCHARGE: 0
VOMITING: 0
ABDOMINAL PAIN: 0
SHORTNESS OF BREATH: 0
CONSTIPATION: 0

## 2023-01-12 ASSESSMENT — PAIN - FUNCTIONAL ASSESSMENT: PAIN_FUNCTIONAL_ASSESSMENT: 0-10

## 2023-01-12 ASSESSMENT — PAIN SCALES - GENERAL: PAINLEVEL_OUTOF10: 8

## 2023-01-13 NOTE — ED PROVIDER NOTES
[address] [place] [address] ED  EMERGENCY DEPARTMENT ENCOUNTER      Pt Name: Rodrigo Urbina  MRN: [id number]  [place] 1955  Date of evaluation: 1/12/2023  Provider: Ramesh Caruso MD    [address] [place] [address]       Chief Complaint   Patient presents with    Fall     Pt fell on [place] day. Pain has not subsided. Leg Pain     right    Knee Pain     Right knee. Feels a \"popping\" when walking     Hip Pain     right         HISTORY OF PRESENT ILLNESS  (Location/Symptom, Timing/Onset, Context/Setting, Quality, Duration, Modifying Factors, Severity.)   Rodrigo Urbina is a 75 y.o. female who presents to the emergency department for pain to her right knee and her right hip. This is an ongoing issue and she has had it for what appears to be a few months. She had x-rays at [facility] ordered as an outpatient, of her knee at the end of December. On [place] Day she fell and she hurt her knee. These x-rays were done after she fell. She rates the pain as an 8. Nursing Notes were reviewed. ALLERGIES     Codeine and Tape [adhesive tape]    CURRENT MEDICATIONS       Previous Medications    ALBUTEROL (PROVENTIL HFA;VENTOLIN HFA) 108 (90 BASE) MCG/ACT INHALER    Inhale 2 puffs into the lungs every 6 hours as needed.     ALBUTEROL (PROVENTIL) (2.5 MG/3ML) 0.083% NEBULIZER SOLUTION    Take 3 mLs by nebulization every 6 hours as needed for Wheezing    ALENDRONATE (FOSAMAX) 70 MG TABLET    take 1 tablet by mouth every week    AMITRIPTYLINE (ELAVIL) 10 MG TABLET    amitriptyline 10 mg tablet   take 1 tablet by mouth nightly    AMLODIPINE (NORVASC) 10 MG TABLET    take 1 tablet by mouth once daily    ASCORBIC ACID (VITAMIN C) 500 MG TABLET    Take 500 mg by mouth    ASPIRIN 325 MG TABLET    Take 325 mg by mouth    AZELASTINE (ASTELIN) 0.1 % NASAL SPRAY    2 sprays by Nasal route 2 times daily Use in each nostril as directed    BUDESONIDE-FORMOTEROL (SYMBICORT) 160-4.5 MCG/ACT AERO    Inhale 2 puffs into the lungs 2 times daily DEXLANSOPRAZOLE (DEXILANT) 60 MG CPDR DELAYED RELEASE CAPSULE    take 1 capsule by mouth once daily    DULOXETINE (CYMBALTA) 30 MG EXTENDED RELEASE CAPSULE    take 1 capsule by mouth once daily    FOLIC ACID (FOLVITE) 1 MG TABLET    Take 1 mg by mouth daily     GABAPENTIN (NEURONTIN) 100 MG CAPSULE        GABAPENTIN (NEURONTIN) 300 MG CAPSULE    take 1 capsule by mouth three times a day    LEFLUNOMIDE (ARAVA) 20 MG TABLET    Take 1 tablet by mouth daily. MELOXICAM (MOBIC) 7.5 MG TABLET    take 1 tablet by mouth once daily with food or milk    METHOTREXATE (RHEUMATREX) 2.5 MG CHEMO TABLET    Take 15 mg by mouth once a week Indications: Takes 6 tabs (=15mg) on Wednesdays     MONTELUKAST (SINGULAIR) 10 MG TABLET    One tablet daily    MULTIPLE VITAMINS-MINERALS (THERAPEUTIC MULTIVITAMIN-MINERALS) TABLET    Take 1 tablet by mouth daily    PREDNISONE (DELTASONE) 20 MG TABLET    take 1 tablet by mouth once daily for 3 days    TIOTROPIUM (SPIRIVA RESPIMAT) 2.5 MCG/ACT AERS INHALER    2 puffs daily    TRAMADOL-ACETAMINOPHEN (ULTRACET) 37.5-325 MG TABS    Take 1-2 tablets by mouth 3 times daily as needed for Pain        PAST MEDICAL HISTORY         Diagnosis Date    Asthma     CHF (congestive heart failure) ([facility] [address] 2006? [facility]    Depression     GERD (gastroesophageal reflux disease)     H/O scarlet fever     Hiatal hernia     Hypertension     Obesity     Osteoarthritis     Rheumatoid arthritis(714.0)     hands, feet, hips, knees    Sleep apnea 2011    Thyroid disease     Ulcer        SURGICAL HISTORY           Procedure Laterality Date    CARPAL TUNNEL RELEASE      left wrist    COLONOSCOPY  03/30/2016    three sessile polyps removed; internal hemorrhoids    EYE SURGERY Right 2010    fluid extraction    FOOT SURGERY  1991    HIP SURGERY  2003    left hip    JOINT REPLACEMENT  2002?     right total knee    KNEE ARTHROPLASTY Right 1/5/2015    revision of arthroplasty    KNEE SURGERY  2005    right knee    OTHER SURGICAL HISTORY Right 04/09/2015    right leg quad tendon repair. ID ESOPHAGOGASTRODUODENOSCOPY TRANSORAL DIAGNOSTIC N/A 11/15/2017    EGD ESOPHAGOGASTRODUODENOSCOPY performed by Emily Sumner MD at [address] ENDOSCOPY  11/15/2017    Esophagitis- LA Class A; moderate sliding hiatal hernia         FAMILY HISTORY           Problem Relation Age of Onset    Breast Cancer Maternal Grandmother     Diabetes Father     Cancer Mother         bone    Hypertension Brother     Diabetes Brother      Family Status   Relation Name Status    MGM  (Not Specified)    Father  (Not Specified)    Mother  (Not Specified)    Brother  (Not Specified)        SOCIAL HISTORY      reports that she quit smoking about 6 years ago. She has a 15.00 pack-year smoking history. She has never used smokeless tobacco. She reports current alcohol use of about 2.0 standard drinks per week. She reports that she does not use drugs. REVIEW OF SYSTEMS    (2-9 systems for level 4, 10 or more for level 5)     Review of Systems   Constitutional:  Negative for chills, fatigue and fever. HENT:  Negative for congestion, ear discharge and facial swelling. Eyes:  Negative for discharge and redness. Respiratory:  Negative for cough and shortness of breath. Cardiovascular:  Negative for chest pain. Gastrointestinal:  Negative for abdominal pain, constipation, diarrhea and vomiting. Genitourinary:  Negative for dysuria and hematuria. Musculoskeletal:  Positive for arthralgias. Skin:  Negative for color change and rash. Neurological:  Negative for syncope, numbness and headaches. Hematological:  Negative for adenopathy. Psychiatric/Behavioral:  Negative for confusion. The patient is not nervous/anxious. Except as noted above the remainder of the review of systems was reviewed and negative.      PHYSICAL EXAM    (up to 7 for level 4, 8 or more for level 5)     Vitals: 01/12/23 2106   BP: 135/60   Pulse: (!) 106   Resp: 18   Temp: 98.3 °F (36.8 °C)   TempSrc: Oral   SpO2: 97%   Weight: 225 lb (102.1 kg)   Height: 5' 3\" (1.6 m)       Physical Exam  Vitals reviewed. Constitutional:       General: She is not in acute distress. Appearance: She is well-developed. She is not diaphoretic. HENT:      Head: Normocephalic and atraumatic. Eyes:      General: No scleral icterus. Right eye: No discharge. Left eye: No discharge. Cardiovascular:      Rate and Rhythm: Normal rate and regular rhythm. Pulmonary:      Effort: Pulmonary effort is normal. No respiratory distress. Breath sounds: Normal breath sounds. No stridor. No wheezing or rales. Abdominal:      General: There is no distension. Palpations: Abdomen is soft. Tenderness: There is no abdominal tenderness. Musculoskeletal:      Cervical back: Neck supple. Comments: The right knee has old healed surgical scars present. No erythema bruising or noted swelling. The right groin has no masses or bruising. Lymphadenopathy:      Cervical: No cervical adenopathy. Skin:     General: Skin is warm and dry. Findings: No erythema or rash. Neurological:      Mental Status: She is alert and oriented to person, place, and time.    Psychiatric:         Behavior: Behavior normal.           DIAGNOSTIC RESULTS     EKG: All EKG's are interpreted by the Emergency Department Physician who either signs or Co-signs this chart in the absence of a cardiologist.    RADIOLOGY:   Non-plain film images such as CT, Ultrasound and MRI are read by the radiologist. Plain radiographic images are visualized and preliminarily interpreted by the emergency physician with the below findings:    Interpretation per the Radiologist below, if available at the time of this note:    XR KNEE RIGHT (3 VIEWS)    Result Date: 1/12/2023  EXAMINATION: THREE XRAY VIEWS OF THE RIGHT KNEE 1/12/2023 10:01 pm COMPARISON: 04/25/2017 HISTORY: ORDERING SYSTEM PROVIDED HISTORY: Pain TECHNOLOGIST PROVIDED HISTORY: Pain Reason for Exam: Pain from a fall on 12/25/22 FINDINGS: Unchanged alignment of a right total knee arthroplasty. Similar appearance of subtle lucency about the distal aspect of the tibial stem which can indicate loosening. No joint effusion. No focal soft tissue abnormality. No acute fracture or dislocation. Peripheral vascular calcifications. Unchanged alignment of a right total knee arthroplasty. No acute bony abnormality. XR HIP 2-3 VW W PELVIS RIGHT    Result Date: 1/12/2023  EXAMINATION: ONE XRAY VIEW OF THE PELVIS AND TWO XRAY VIEWS RIGHT HIP 1/12/2023 10:01 pm COMPARISON: None. HISTORY: ORDERING SYSTEM PROVIDED HISTORY: pain, groin TECHNOLOGIST PROVIDED HISTORY: pain, groin Reason for Exam: Pain from a fall on 12/25/22 FINDINGS: Status post left total hip arthroplasty. No displaced pelvic fracture. The sacroiliac joints and pubic symphysis appear intact. Expected alignment of the left total hip arthroplasty. The right femoral neck is without radiographic evidence of fracture. Normal alignment of the right hip. The articular surfaces are smooth. No focal soft tissue abnormality. No acute bony abnormality of the pelvis or right proximal femur. Please note that radiography may not reveal nondisplaced fractures and stress changes. If there is sufficient clinical suspicion, CT may be helpful in the emergent setting to better evaluate. However, MRI is the study of choice. If there is a contraindication to MRI, nuclear medicine bone scan is the next option. LABS:  Labs Reviewed - No data to display    All other labs were within normal range or not returned as of this dictation.     EMERGENCY DEPARTMENT COURSE and DIFFERENTIAL DIAGNOSIS/MDM:   Vitals:    Vitals:    01/12/23 2106   BP: 135/60   Pulse: (!) 106   Resp: 18   Temp: 98.3 °F (36.8 °C)   TempSrc: Oral   SpO2: 97%   Weight: 225 lb (102.1 kg)   Height: 5' 3\" (1.6 m)       Orders Placed This Encounter   Medications    HYDROcodone-acetaminophen (NORCO) 5-325 MG per tablet 1 tablet    HYDROcodone-acetaminophen (NORCO) 5-325 MG per tablet     Sig: Take 1 tablet by mouth every 6 hours as needed for Pain for up to 3 days. Max Daily Amount: 4 tablets     Dispense:  12 tablet     Refill:  0         Medical Decision Making: X-ray findings are discussed with the patient. She has follow-up with orthopedist 7 days from today. She will keep that appointment and was provided pain medication. Treatment diagnosis and follow-up were discussed with the patient. Evaluation and treatment course in the ED, and plan of care upon discharge was discussed in length with the patient. Patient had no further questions prior to being discharged and was instructed to return to the ED for new or worsening symptoms. DDx include fracture, sprain        Test considered, but not ordered: None    The patient was involved in his/her plan of care. The testing that was ordered was discussed with the patient. Any medications that may have been ordered were discussed with the patient. I have reviewed the patient's previous medical records using the electronic health record that we have available. CONSULTS:  None    PROCEDURES:  None    FINAL IMPRESSION      1. Right knee pain, unspecified chronicity          DISPOSITION/PLAN   DISPOSITION Decision To Discharge 01/12/2023 10:22:12 PM      PATIENT REFERRED TO:   DO Matt Otero [id number]  [phone]      As needed    [facility] ED  [address] [facility]  [phone]    If symptoms worsen    Your orthopedist      As scheduled    DISCHARGE MEDICATIONS:     New Prescriptions    HYDROCODONE-ACETAMINOPHEN (NORCO) 5-325 MG PER TABLET    Take 1 tablet by mouth every 6 hours as needed for Pain for up to 3 days.  Max Daily Amount: 4 tablets       The care is provided during an unprecedented national emergency due to the novel coronavirus, COVID-19.     (Please note that portions of this note were completed with a voice recognition program.  Efforts were made to edit the dictations but occasionally words are mis-transcribed.)    Fidel Harden MD  Attending Emergency Physician            Fidel Harden MD  01/12/23 1776

## 2023-08-23 ENCOUNTER — HOSPITAL ENCOUNTER (OUTPATIENT)
Dept: MRI IMAGING | Facility: CLINIC | Age: 68
Discharge: HOME | End: 2023-08-25
Payer: MEDICARE

## 2023-08-23 DIAGNOSIS — M75.101 NONTRAUMATIC ROTATOR CUFF TEAR, RIGHT: ICD-10-CM

## 2023-08-23 PROCEDURE — 73221 MRI JOINT UPR EXTREM W/O DYE: CPT

## (undated) DEVICE — CUP MED 1OZ CLR POLYPR FEED GRAD W/O LID

## (undated) DEVICE — ADAPTER TBNG LUER STUB 15 GA INTMED

## (undated) DEVICE — MEDI-VAC NON-CONDUCTIVE SUCTION TUBING: Brand: CARDINAL HEALTH

## (undated) DEVICE — SOLUTION IV IRRIG WATER 1000ML POUR BRL 2F7114

## (undated) DEVICE — BLOCK BITE 60FR RUBBER ADLT DENTAL

## (undated) DEVICE — 60 ML SYRINGE LUER-LOCK TIP: Brand: MONOJECT

## (undated) DEVICE — JELLY,LUBE,STERILE,FLIP TOP,TUBE,2-OZ: Brand: MEDLINE

## (undated) DEVICE — BASIN EMESIS 500CC ROSE 250/CS 60/PLT: Brand: MEDEGEN MEDICAL PRODUCTS, LLC

## (undated) DEVICE — Device: Brand: ENDOGATOR HYBRID IRRIGATION TUBING